# Patient Record
Sex: FEMALE | Race: WHITE | Employment: OTHER | URBAN - METROPOLITAN AREA
[De-identification: names, ages, dates, MRNs, and addresses within clinical notes are randomized per-mention and may not be internally consistent; named-entity substitution may affect disease eponyms.]

---

## 2017-05-11 ENCOUNTER — HOSPITAL ENCOUNTER (OUTPATIENT)
Dept: MAMMOGRAPHY | Age: 57
Discharge: HOME | End: 2017-05-11
Attending: INTERNAL MEDICINE
Payer: COMMERCIAL

## 2017-05-11 DIAGNOSIS — Z12.31 VISIT FOR SCREENING MAMMOGRAM: ICD-10-CM

## 2017-05-11 PROCEDURE — 77067 SCR MAMMO BI INCL CAD: CPT

## 2018-01-03 ENCOUNTER — HOSPITAL ENCOUNTER (EMERGENCY)
Age: 58
Discharge: HOME | End: 2018-01-04
Attending: EMERGENCY MEDICINE
Payer: OTHER MISCELLANEOUS

## 2018-01-03 DIAGNOSIS — Z77.098 CHEMICAL EXPOSURE: Primary | ICD-10-CM

## 2018-01-03 PROCEDURE — 85025 COMPLETE CBC W/AUTO DIFF WBC: CPT | Performed by: EMERGENCY MEDICINE

## 2018-01-03 PROCEDURE — 36415 COLL VENOUS BLD VENIPUNCTURE: CPT | Performed by: EMERGENCY MEDICINE

## 2018-01-03 PROCEDURE — 99284 EMERGENCY DEPT VISIT MOD MDM: CPT

## 2018-01-03 PROCEDURE — 80053 COMPREHEN METABOLIC PANEL: CPT | Performed by: EMERGENCY MEDICINE

## 2018-01-03 PROCEDURE — 93005 ELECTROCARDIOGRAM TRACING: CPT

## 2018-01-04 ENCOUNTER — APPOINTMENT (OUTPATIENT)
Dept: GENERAL RADIOLOGY | Age: 58
End: 2018-01-04
Attending: EMERGENCY MEDICINE
Payer: OTHER MISCELLANEOUS

## 2018-01-04 VITALS
WEIGHT: 165.6 LBS | TEMPERATURE: 97.8 F | SYSTOLIC BLOOD PRESSURE: 121 MMHG | DIASTOLIC BLOOD PRESSURE: 72 MMHG | HEIGHT: 65 IN | OXYGEN SATURATION: 98 % | BODY MASS INDEX: 27.59 KG/M2 | HEART RATE: 82 BPM | RESPIRATION RATE: 16 BRPM

## 2018-01-04 LAB
ALBUMIN SERPL-MCNC: 3.9 G/DL (ref 3.5–5)
ALBUMIN/GLOB SERPL: 1.1 {RATIO} (ref 1.1–2.2)
ALP SERPL-CCNC: 63 U/L (ref 45–117)
ALT SERPL-CCNC: 26 U/L (ref 12–78)
ANION GAP SERPL CALC-SCNC: 9 MMOL/L (ref 5–15)
AST SERPL-CCNC: 20 U/L (ref 15–37)
ATRIAL RATE: 79 BPM
BASOPHILS # BLD: 0 K/UL (ref 0–0.1)
BASOPHILS NFR BLD: 1 % (ref 0–1)
BILIRUB SERPL-MCNC: 0.4 MG/DL (ref 0.2–1)
BUN SERPL-MCNC: 14 MG/DL (ref 6–20)
BUN/CREAT SERPL: 20 (ref 12–20)
CALCIUM SERPL-MCNC: 8.9 MG/DL (ref 8.5–10.1)
CALCULATED P AXIS, ECG09: 51 DEGREES
CALCULATED R AXIS, ECG10: -8 DEGREES
CALCULATED T AXIS, ECG11: 26 DEGREES
CHLORIDE SERPL-SCNC: 107 MMOL/L (ref 97–108)
CO2 SERPL-SCNC: 24 MMOL/L (ref 21–32)
COHGB MFR BLD: 1.1 % (ref 1–2)
CREAT SERPL-MCNC: 0.71 MG/DL (ref 0.55–1.02)
DIAGNOSIS, 93000: NORMAL
EOSINOPHIL # BLD: 0.2 K/UL (ref 0–0.4)
EOSINOPHIL NFR BLD: 2 % (ref 0–7)
ERYTHROCYTE [DISTWIDTH] IN BLOOD BY AUTOMATED COUNT: 13.3 % (ref 11.5–14.5)
GLOBULIN SER CALC-MCNC: 3.6 G/DL (ref 2–4)
GLUCOSE SERPL-MCNC: 99 MG/DL (ref 65–100)
HCT VFR BLD AUTO: 38.8 % (ref 35–47)
HGB BLD OXIMETRY-MCNC: 14.7 G/DL (ref 14–17)
HGB BLD-MCNC: 13.1 G/DL (ref 11.5–16)
LYMPHOCYTES # BLD: 3.2 K/UL (ref 0.8–3.5)
LYMPHOCYTES NFR BLD: 40 % (ref 12–49)
MCH RBC QN AUTO: 30 PG (ref 26–34)
MCHC RBC AUTO-ENTMCNC: 33.8 G/DL (ref 30–36.5)
MCV RBC AUTO: 89 FL (ref 80–99)
METHGB MFR BLD: 0.2 % (ref 0–1.4)
MONOCYTES # BLD: 0.4 K/UL (ref 0–1)
MONOCYTES NFR BLD: 5 % (ref 5–13)
NEUTS SEG # BLD: 4.1 K/UL (ref 1.8–8)
NEUTS SEG NFR BLD: 52 % (ref 32–75)
OXYHGB MFR BLD: 97.8 % (ref 94–97)
P-R INTERVAL, ECG05: 162 MS
PLATELET # BLD AUTO: 218 K/UL (ref 150–400)
POTASSIUM SERPL-SCNC: 3.4 MMOL/L (ref 3.5–5.1)
PROT SERPL-MCNC: 7.5 G/DL (ref 6.4–8.2)
Q-T INTERVAL, ECG07: 408 MS
QRS DURATION, ECG06: 76 MS
QTC CALCULATION (BEZET), ECG08: 467 MS
RBC # BLD AUTO: 4.36 M/UL (ref 3.8–5.2)
SAO2 % BLD: 99 % (ref 95–99)
SODIUM SERPL-SCNC: 140 MMOL/L (ref 136–145)
VENTRICULAR RATE, ECG03: 79 BPM
WBC # BLD AUTO: 7.9 K/UL (ref 3.6–11)

## 2018-01-04 PROCEDURE — 74011250636 HC RX REV CODE- 250/636: Performed by: PHYSICIAN ASSISTANT

## 2018-01-04 PROCEDURE — 77030029684 HC NEB SM VOL KT MONA -A

## 2018-01-04 PROCEDURE — 96376 TX/PRO/DX INJ SAME DRUG ADON: CPT

## 2018-01-04 PROCEDURE — 96361 HYDRATE IV INFUSION ADD-ON: CPT

## 2018-01-04 PROCEDURE — 94640 AIRWAY INHALATION TREATMENT: CPT

## 2018-01-04 PROCEDURE — 74011000250 HC RX REV CODE- 250: Performed by: PHYSICIAN ASSISTANT

## 2018-01-04 PROCEDURE — 96374 THER/PROPH/DIAG INJ IV PUSH: CPT

## 2018-01-04 PROCEDURE — 82375 ASSAY CARBOXYHB QUANT: CPT | Performed by: EMERGENCY MEDICINE

## 2018-01-04 PROCEDURE — 96375 TX/PRO/DX INJ NEW DRUG ADDON: CPT

## 2018-01-04 PROCEDURE — 71046 X-RAY EXAM CHEST 2 VIEWS: CPT

## 2018-01-04 RX ORDER — ALBUTEROL SULFATE 0.83 MG/ML
5 SOLUTION RESPIRATORY (INHALATION)
Status: COMPLETED | OUTPATIENT
Start: 2018-01-04 | End: 2018-01-04

## 2018-01-04 RX ORDER — ONDANSETRON 4 MG/1
4 TABLET, ORALLY DISINTEGRATING ORAL
Qty: 12 TAB | Refills: 0 | Status: SHIPPED | OUTPATIENT
Start: 2018-01-04 | End: 2018-01-14

## 2018-01-04 RX ORDER — ONDANSETRON 2 MG/ML
4 INJECTION INTRAMUSCULAR; INTRAVENOUS
Status: COMPLETED | OUTPATIENT
Start: 2018-01-04 | End: 2018-01-04

## 2018-01-04 RX ORDER — KETOROLAC TROMETHAMINE 30 MG/ML
15 INJECTION, SOLUTION INTRAMUSCULAR; INTRAVENOUS
Status: COMPLETED | OUTPATIENT
Start: 2018-01-04 | End: 2018-01-04

## 2018-01-04 RX ORDER — PREDNISONE 20 MG/1
60 TABLET ORAL DAILY
Qty: 15 TAB | Refills: 0 | Status: SHIPPED | OUTPATIENT
Start: 2018-01-04 | End: 2018-01-09

## 2018-01-04 RX ORDER — ONDANSETRON 2 MG/ML
8 INJECTION INTRAMUSCULAR; INTRAVENOUS
Status: COMPLETED | OUTPATIENT
Start: 2018-01-04 | End: 2018-01-04

## 2018-01-04 RX ORDER — ALBUTEROL SULFATE 90 UG/1
1 AEROSOL, METERED RESPIRATORY (INHALATION)
Qty: 1 INHALER | Refills: 0 | Status: SHIPPED | OUTPATIENT
Start: 2018-01-04 | End: 2018-09-07 | Stop reason: ALTCHOICE

## 2018-01-04 RX ADMIN — ONDANSETRON 4 MG: 2 INJECTION INTRAMUSCULAR; INTRAVENOUS at 02:39

## 2018-01-04 RX ADMIN — ALBUTEROL SULFATE 5 MG: 2.5 SOLUTION RESPIRATORY (INHALATION) at 03:13

## 2018-01-04 RX ADMIN — ONDANSETRON 8 MG: 2 INJECTION INTRAMUSCULAR; INTRAVENOUS at 00:41

## 2018-01-04 RX ADMIN — KETOROLAC TROMETHAMINE 15 MG: 30 INJECTION, SOLUTION INTRAMUSCULAR at 02:03

## 2018-01-04 RX ADMIN — SODIUM CHLORIDE 1000 ML: 900 INJECTION, SOLUTION INTRAVENOUS at 02:03
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
## 2018-01-04 NOTE — DISCHARGE INSTRUCTIONS
Exposure to Toxins: Care Instructions  Your Care Instructions    Toxins are poisonous substances that can harm your body. If your doctor is concerned that your symptoms are caused by exposure to a toxic substance, he or she may ask you about your home, your work, your family, and other aspects of your environment. You also may have blood tests or X-rays to find out if a toxin is in your body. For example, you may have been around smoke from a fire. Or you may have been around fumes from paints, solvents, or waste products from workshops or factories. But in some cases it may be hard to find out what you may have been exposed to. Sometimes it can take years before you have symptoms. For instance, a  may have lung disease many years after working in mines. And being exposed to some toxins can make health problems you already have worse. Follow-up care is a key part of your treatment and safety. Be sure to make and go to all appointments, and call your doctor if you are having problems. It's also a good idea to know your test results and keep a list of the medicines you take. How can you care for yourself at home? · If you think you may have been exposed to a toxin but are not sure what it might be, try to keep a written record of your symptoms. Note when and where you have symptoms. And note any possible health hazards. For example, you may find out that you feel sick to your stomach during your workweek, but you feel better on weekends. · It may help to increase the amount of fresh air in your home. · If you can, try to control your exposure to hazardous materials. ¨ Avoid cigarette smoke. Cigarettes contain many chemicals that are hazardous to your health. ¨ Keep your home clean and as free from dust as you can. Dust can irritate your lungs. ¨ Get a carbon monoxide alarm for your home. Carbon monoxide comes from cars, space heaters, and other heat sources. It can be deadly.   ¨ When you use cleaning or home improvement products, make sure to open windows or use an exhaust fan. Never mix household chemicals, such as chlorine and ammonia. Some mixtures can create toxic fumes that can be deadly. ¨ Read the label on house and garden chemicals. Be sure to follow all safety directions. Try to limit your use of lawn and garden pesticides. ¨ Keep in mind that the farther away you are from a hazardous source, the less exposure you will receive. When should you call for help? Call 911 anytime you think you may need emergency care. For example, call if:  ? · You have trouble breathing. ?Call your doctor now or seek immediate medical care if:  ? · You get household chemicals in your mouth or eyes. Call the [address] [place] [address] ([phone]). ? · You think you may have been exposed to a hazardous material.   ? Watch closely for changes in your health, and be sure to contact your doctor if:  ? · You do not get better as expected. Where can you learn more? Go to http://roro-benito.info/. Enter B226 in the search box to learn more about \"Exposure to Toxins: Care Instructions. \"  Current as of: July 29, 2016  Content Version: 11.4  © 5086-4916 [employer]. Care instructions adapted under license by [employer] (which disclaims liability or warranty for this information). If you have questions about a medical condition or this instruction, always ask your healthcare professional. [place] [address] any warranty or liability for your use of this information. We hope that we have addressed all of your medical concerns. The examination and treatment you received in the Emergency Department were for an emergent problem and were not intended as complete care. It is important that you follow up with your healthcare provider(s) for ongoing care.  If your symptoms worsen or do not improve as expected, and you are unable to reach your usual health care provider(s), you should return to the Emergency Department. Today's healthcare is undergoing tremendous change, and patient satisfaction surveys are one of the many tools to assess the quality of medical care. You may receive a survey from the [employer] organization regarding your experience in the Emergency Department. I hope that your experience has been completely positive, particularly the medical care that I provided. As such, please participate in the survey; anything less than excellent does not meet my expectations or intentions. [facility] and [facility] participate in nationally recognized quality of care measures. If your blood pressure is greater than 120/80, as reported below, we urge that you seek medical care to address the potential of high blood pressure, commonly known as hypertension. Hypertension can be hereditary or can be caused by certain medical conditions, pain, stress, or \"white coat syndrome. \"       Please make an appointment with your health care provider(s) for follow up of your Emergency Department visit. VITALS:   Patient Vitals for the past 8 hrs:   Temp Pulse Resp BP SpO2   01/04/18 0230 98 °F (36.7 °C) 80 16 146/85 -   01/04/18 0130 - - - (!) 125/94 -   01/03/18 2335 97.4 °F (36.3 °C) 72 18 (!) 149/98 97 %          Thank you for allowing us to provide you with medical care today. We realize that you have many choices for your emergency care needs. Please choose us in the future for any continued health care needs. [facility]  Francisco Lombardi, [address]: [phone]            Recent Results (from the past 24 hour(s))   EKG, 12 LEAD, INITIAL    Collection Time: 01/03/18 11:44 PM   Result Value Ref Range    Ventricular Rate 79 BPM    Atrial Rate 79 BPM    P-R Interval 162 ms    QRS Duration 76 ms    Q-T Interval 408 ms    QTC Calculation (Bezet) 467 ms Calculated P Axis 51 degrees    Calculated R Axis -8 degrees    Calculated T Axis 26 degrees    Diagnosis       Sinus rhythm with premature supraventricular complexes and with occasional   premature ventricular complexes  When compared with ECG of 07-FEB-2014 06:18,  premature ventricular complexes are now present  premature supraventricular complexes are now present     CBC WITH AUTOMATED DIFF    Collection Time: 01/03/18 11:52 PM   Result Value Ref Range    WBC 7.9 3.6 - 11.0 K/uL    RBC 4.36 3.80 - 5.20 M/uL    HGB 13.1 11.5 - 16.0 g/dL    HCT 38.8 35.0 - 47.0 %    MCV 89.0 80.0 - 99.0 FL    MCH 30.0 26.0 - 34.0 PG    MCHC 33.8 30.0 - 36.5 g/dL    RDW 13.3 11.5 - 14.5 %    PLATELET 560 349 - 012 K/uL    NEUTROPHILS 52 32 - 75 %    LYMPHOCYTES 40 12 - 49 %    MONOCYTES 5 5 - 13 %    EOSINOPHILS 2 0 - 7 %    BASOPHILS 1 0 - 1 %    ABS. NEUTROPHILS 4.1 1.8 - 8.0 K/UL    ABS. LYMPHOCYTES 3.2 0.8 - 3.5 K/UL    ABS. MONOCYTES 0.4 0.0 - 1.0 K/UL    ABS. EOSINOPHILS 0.2 0.0 - 0.4 K/UL    ABS. BASOPHILS 0.0 0.0 - 0.1 K/UL   METABOLIC PANEL, COMPREHENSIVE    Collection Time: 01/03/18 11:52 PM   Result Value Ref Range    Sodium 140 136 - 145 mmol/L    Potassium 3.4 (L) 3.5 - 5.1 mmol/L    Chloride 107 97 - 108 mmol/L    CO2 24 21 - 32 mmol/L    Anion gap 9 5 - 15 mmol/L    Glucose 99 65 - 100 mg/dL    BUN 14 6 - 20 MG/DL    Creatinine 0.71 0.55 - 1.02 MG/DL    BUN/Creatinine ratio 20 12 - 20      GFR est AA >60 >60 ml/min/1.73m2    GFR est non-AA >60 >60 ml/min/1.73m2    Calcium 8.9 8.5 - 10.1 MG/DL    Bilirubin, total 0.4 0.2 - 1.0 MG/DL    ALT (SGPT) 26 12 - 78 U/L    AST (SGOT) 20 15 - 37 U/L    Alk.  phosphatase 63 45 - 117 U/L    Protein, total 7.5 6.4 - 8.2 g/dL    Albumin 3.9 3.5 - 5.0 g/dL    Globulin 3.6 2.0 - 4.0 g/dL    A-G Ratio 1.1 1.1 - 2.2     CARBOXY HEMOGLOBIN    Collection Time: 01/04/18 12:15 AM   Result Value Ref Range    Carboxy-Hgb 1.1 1 - 2 %    Methemoglobin 0.2 0 - 1.4 %    tHb 14.7 14 - 17 g/dL Oxyhemoglobin 97.8 (H) 94 - 97 %    O2 SATURATION 99 95 - 99 %       Xr Chest Pa Lat    Result Date: 1/4/2018  HISTORY:  Chemical exposure Frontal and lateral views of the chest show clear lungs. The heart, mediastinum and pulmonary vasculature are normal.  The bony thorax is unremarkable. IMPRESSION: NORMAL CHEST.

## 2018-01-04 NOTE — ED NOTES
Spoke with Judson from poison control, and updated vitals, lab results, and patient symptoms. Recommended to treat patient symptomatically, and possibly systemic corticosteroids.

## 2018-01-04 NOTE — ED NOTES
Poison control contacted: poss symptoms are n/v/d, abdominal pain, bronchospasm, respiratory distress. Corticosteroids may be used for bronchospasm. Observe until symptoms resolved.

## 2018-01-18 ENCOUNTER — HOSPITAL ENCOUNTER (OUTPATIENT)
Dept: CT IMAGING | Age: 58
Discharge: HOME | End: 2018-01-18
Attending: INTERNAL MEDICINE
Payer: OTHER MISCELLANEOUS

## 2018-01-18 DIAGNOSIS — T59.91XS: ICD-10-CM

## 2018-01-18 DIAGNOSIS — R05.9 COUGH: ICD-10-CM

## 2018-01-18 PROCEDURE — 71250 CT THORAX DX C-: CPT

## 2018-02-23 ENCOUNTER — HOSPITAL ENCOUNTER (OUTPATIENT)
Dept: CT IMAGING | Age: 58
Discharge: HOME | End: 2018-02-23
Attending: INTERNAL MEDICINE
Payer: OTHER MISCELLANEOUS

## 2018-02-23 DIAGNOSIS — R11.0 NAUSEA: ICD-10-CM

## 2018-02-23 DIAGNOSIS — R10.9 ABDOMINAL PAIN: ICD-10-CM

## 2018-02-23 PROCEDURE — 74011000258 HC RX REV CODE- 258: Performed by: INTERNAL MEDICINE

## 2018-02-23 PROCEDURE — 74011636320 HC RX REV CODE- 636/320: Performed by: INTERNAL MEDICINE

## 2018-02-23 PROCEDURE — 74177 CT ABD & PELVIS W/CONTRAST: CPT

## 2018-02-23 RX ORDER — SODIUM CHLORIDE 0.9 % (FLUSH) 0.9 %
10 SYRINGE (ML) INJECTION
Status: COMPLETED | OUTPATIENT
Start: 2018-02-23 | End: 2018-02-23

## 2018-02-23 RX ADMIN — IOHEXOL 50 ML: 240 INJECTION, SOLUTION INTRATHECAL; INTRAVASCULAR; INTRAVENOUS; ORAL at 10:27

## 2018-02-23 RX ADMIN — SODIUM CHLORIDE 100 ML: 900 INJECTION, SOLUTION INTRAVENOUS at 10:27

## 2018-02-23 RX ADMIN — Medication 10 ML: at 10:26

## 2018-02-23 RX ADMIN — IOPAMIDOL 100 ML: 755 INJECTION, SOLUTION INTRAVENOUS at 10:26

## 2018-09-06 PROBLEM — R00.2 PALPITATIONS: Status: ACTIVE | Noted: 2018-09-06

## 2018-09-07 ENCOUNTER — CLINICAL SUPPORT (OUTPATIENT)
Dept: CARDIOLOGY CLINIC | Age: 58
End: 2018-09-07

## 2018-09-07 ENCOUNTER — OFFICE VISIT (OUTPATIENT)
Dept: CARDIOLOGY CLINIC | Age: 58
End: 2018-09-07

## 2018-09-07 VITALS
HEIGHT: 65 IN | OXYGEN SATURATION: 95 % | HEART RATE: 90 BPM | BODY MASS INDEX: 26.82 KG/M2 | RESPIRATION RATE: 18 BRPM | SYSTOLIC BLOOD PRESSURE: 110 MMHG | DIASTOLIC BLOOD PRESSURE: 80 MMHG | WEIGHT: 161 LBS

## 2018-09-07 DIAGNOSIS — R00.2 PALPITATIONS: Primary | ICD-10-CM

## 2018-09-07 DIAGNOSIS — R07.89 OTHER CHEST PAIN: ICD-10-CM

## 2018-09-07 DIAGNOSIS — R00.2 PALPITATIONS: ICD-10-CM

## 2018-09-07 RX ORDER — MULTIVIT WITH MINERALS/HERBS
1 TABLET ORAL DAILY
COMMUNITY

## 2018-09-07 RX ORDER — AA/PROT/LYSINE/METHIO/VIT C/B6 50-12.5 MG
TABLET ORAL
COMMUNITY

## 2018-09-07 RX ORDER — GLUCOSAMINE HCL 500 MG
TABLET ORAL
COMMUNITY

## 2018-09-07 RX ORDER — INDOMETHACIN 25 MG/1
100 CAPSULE ORAL AS NEEDED
Status: ON HOLD | COMMUNITY
End: 2022-04-12

## 2018-09-07 RX ORDER — ZINC GLUCONATE 10 MG
LOZENGE ORAL
COMMUNITY

## 2018-09-07 RX ORDER — DESIPRAMINE HYDROCHLORIDE 25 MG/1
TABLET ORAL
Refills: 12 | COMMUNITY
Start: 2018-06-25 | End: 2019-03-21 | Stop reason: ALTCHOICE

## 2018-09-07 RX ORDER — BISMUTH SUBSALICYLATE 262 MG
1 TABLET,CHEWABLE ORAL DAILY
COMMUNITY

## 2018-09-07 RX ORDER — ASCORBIC ACID 250 MG
TABLET ORAL
COMMUNITY

## 2018-09-07 RX ORDER — MODAFINIL 100 MG/1
TABLET ORAL
Refills: 1 | Status: ON HOLD | COMMUNITY
Start: 2018-06-04 | End: 2022-04-12

## 2018-09-07 NOTE — MR AVS SNAPSHOT
[address] [facility] [address] [place] 
[phone] Patient: Justino Salinas MRN: [id number] VBL:[birth year] Visit Information Date & Time Provider Department Dept. Phone Encounter #  
 9/7/2018 11:00 AM Rene Castro MD [facility] Matthew Ojeda [phone] [id number] Follow-up Instructions Return in about 4 weeks (around 10/5/2018). Your Appointments 9/18/2018  4:00 PM  
ECHO CARDIOGRAMS 2D with ECHO, [facility] ([facility] SCHEDULING) Appt Note: echo per Dr. Tobi Silver dx PVCs, palps [address] [place] [address]  [address] [place] [address]  
[address] [facility]  
  
    
 10/8/2018 10:20 AM  
ESTABLISHED PATIENT with Rene Castro MD  
[facility] ([facility] SCHEDULING) Appt Note: 1 mo f/u  
 [address] [place] [address]  
[address] [facility],[address] [address] Upcoming Health Maintenance Date Due Hepatitis C Screening 1960 DTaP/Tdap/Td series (1 - Tdap) 11/9/1980 PAP AKA CERVICAL CYTOLOGY 11/9/1980 FOBT Q 1 YEAR AGE 50-75 11/9/2009 Influenza Age 5 to Adult 8/1/2018 BREAST CANCER SCRN MAMMOGRAM 5/11/2019 Allergies as of 9/7/2018  Review Complete On: 9/7/2018 By: Rene Castro MD  
  
 Severity Noted Reaction Type Reactions Compazine [Prochlorperazine Edisylate] Medium 06/28/2011   Systemic Other (comments) Tardive dyskinesia Red Dye  10/01/2012    Hives Current Immunizations  Never Reviewed No immunizations on file. Not reviewed this visit You Were Diagnosed With   
  
 Codes Comments Palpitations    -  Primary ICD-10-CM: R00.2 ICD-9-CM: 785.1 Other chest pain     ICD-10-CM: R07.89 ICD-9-CM: 786.59 Vitals BP Pulse Resp Height(growth percentile) Weight(growth percentile) SpO2  
 110/80 (BP 1 Location: Left arm, BP Patient Position: Sitting) 90 18 5' 5\" (1.651 m) 161 lb (73 kg) 95% BMI OB Status Smoking Status 26.79 kg/m2 Postmenopausal Never Smoker Vitals History BMI and BSA Data Body Mass Index Body Surface Area  
 26.79 kg/m 2 1.83 m 2 Preferred Pharmacy Pharmacy Name Phone [id number]. [phone] Your Updated Medication List  
  
   
This list is accurate as of 9/7/18  1:01 PM.  Always use your most recent med list.  
  
  
  
  
 b complex vitamins tablet Take 1 Tab by mouth daily. Cholecalciferol (Vitamin D3) 3,000 unit Tab Take  by mouth. CO Q-10 10 mg Cap Generic drug:  coenzyme q10 Take  by mouth. desipramine 10 mg tablet Commonly known as:  NOPRAMIN  
TAKE 1-2 TABLETS BY MOUTH 2 TIMES A DAY  
  
 FISH OIL PO Take  by mouth. indomethacin 25 mg capsule Commonly known as:  INDOCIN Take 100 mg by mouth daily. magnesium 250 mg Tab Take  by mouth.  
  
 modafinil 100 mg tablet Commonly known as:  PROVIGIL  
TAKE 1 TABLET BY MOUTH EVERY MORNING  
  
 multivitamin tablet Commonly known as:  ONE A DAY Take 1 Tab by mouth daily. N-ACETYL-ALPHA-D-GLUCOSAMINE  
by Does Not Apply route. POTASSIUM PO Take  by mouth. QUEtiapine 25 mg tablet Commonly known as:  SEROquel TAKE 1 TABLET BY MOUTH AT BEDTIME OR AS DIRECTED  
  
 VITAMIN C 250 mg tablet Generic drug:  ascorbic acid (vitamin C) Take  by mouth. Follow-up Instructions Return in about 4 weeks (around 10/5/2018). Introducing [facility]! Dear Tong Rodriguez: Thank you for requesting a [employer]ervault account. Our records indicate that you already have an active [employer]ervault account. You can access your account anytime at [email or web address] Did you know that you can access your hospital and ER discharge instructions at any time in [employer]? You can also review all of your test results from your hospital stay or ER visit. Additional Information If you have questions, please visit the Frequently Asked Questions section of the [employer] website at [email or web address] Remember, [employer] is NOT to be used for urgent needs. For medical emergencies, dial 911. Now available from your iPhone and Android! Please provide this summary of care documentation to your next provider. Your primary care clinician is listed as Jim. If you have any questions after today's visit, please call [phone].

## 2018-09-07 NOTE — PROGRESS NOTES
HISTORY OF PRESENT ILLNESS  Moy Aguilera is a 61 y.o. female     SUMMARY:   Problem List  Date Reviewed: 8/6/2018          Codes Class Noted    Palpitations ICD-10-CM: R00.2  ICD-9-CM: 785.1  9/6/2018        Irritable bowel syndrome with diarrhea ICD-10-CM: K58.0  ICD-9-CM: 564.1  9/20/2016        Desmoid tumor, abdominal incision ICD-10-CM: D48.1  ICD-9-CM: 238.1  2/6/2014        Incisional pain ICD-10-CM: E21.90  ICD-9-CM: 782.0  1/13/2014        Wound infection after surgery ICD-10-CM: T81. 4XXA  ICD-9-CM: 998.59  11/7/2012        Diverticulitis ICD-10-CM: Q99.06  ICD-9-CM: 562.11  9/10/2012              Current Outpatient Prescriptions on File Prior to Visit   Medication Sig    QUEtiapine (SEROQUEL) 25 mg tablet TAKE 1 TABLET BY MOUTH AT BEDTIME OR AS DIRECTED    albuterol (PROVENTIL HFA, VENTOLIN HFA, PROAIR HFA) 90 mcg/actuation inhaler Take 1 Puff by inhalation every four (4) hours as needed for Wheezing.  dicyclomine (BENTYL) 10 mg capsule Take 1 Cap by mouth four (4) times daily as needed. No current facility-administered medications on file prior to visit. CARDIOLOGY STUDIES TO DATE:  none  Chief Complaint   Patient presents with    Palpitations     HPI :  Ms. Opal Renteria is a 61year-old  referred by Dr. Rikki Vasques for cardiac evaluation. 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 She has had hyperbaric oxygen and has visited multiple different toxicologists to try to sort through all of this. Her concern for us is frequent palpitations that she has noticed ever since this started and she had not had prior to it. They will occur off and on throughout the day without any associated symptoms. Her  who is a podiatrist did get her some sort of home monitor, which she showed me and I reviewed. It shows sinus rhythm with PACs and PVCs. She walks 30 minutes twice a day without any symptoms suggestive of angina or heart failure, though she does have occasional nonexertional sharp localized anterior chest pain. There is no history of hypertension or diabetes. She has never smoked. Cholesterol has been okay. Family history is negative for premature coronary disease. I reviewed her EKG done by Dr. Cedric Martinez in early August, which showed normal sinus rhythm, normal intervals and axis and no ST-T wave changes. She has occasional episodes of dizziness, which is associated with nausea and it sounds like central rather than orthostatic. She has frequent headaches and some trouble swallowing. She has difficulty sleeping, memory problems, nervousness and anxiety. CARDIAC ROS:   negative for dyspnea, syncope, orthopnea, paroxysmal nocturnal dyspnea, exertional chest pressure/discomfort, claudication, lower extremity edema    Family History   Problem Relation Age of Onset    Hypertension Mother     Stroke Mother     Diabetes Mother     Asthma Neg Hx     Cancer Neg Hx     Anesth Problems Neg Hx        Past Medical History:   Diagnosis Date    Desmoid tumor, abdominal incision 2/6/2014    Diverticulitis     Partial colectomy 2012    Irritable bowel syndrome with diarrhea 9/20/2016    Wound infection after surgery 11/7/2012       GENERAL ROS:  A comprehensive review of systems was negative except for that written in the HPI.     Visit Vitals    /80 (BP 1 Location: Left arm, BP Patient Position: Sitting)    Pulse 90    Resp 18    Ht 5' 5\" (1.651 m)    Wt 161 lb (73 kg)    SpO2 95%    BMI 26.79 kg/m2       Wt Readings from Last 3 Encounters:   09/07/18 161 lb (73 kg)   08/02/18 164 lb (74.4 kg)   05/09/18 163 lb (73.9 kg)            BP Readings from Last 3 Encounters:   09/07/18 110/80   08/02/18 115/70   04/09/18 125/80       PHYSICAL EXAM  General appearance: alert, cooperative, no distress, appears stated age  Neurologic: Alert and oriented X 3  Neck: supple, symmetrical, trachea midline, no adenopathy, no carotid bruit and no JVD  Lungs: clear to auscultation bilaterally  Heart: regular rate and rhythm, S1, S2 normal, no murmur, click, rub or gallop  Abdomen: soft, non-tender. Bowel sounds normal. No masses,  no organomegaly  Extremities: extremities normal, atraumatic, no cyanosis or edema  Pulses: 2+ and symmetric      ASSESSMENT  With all of this going on with Ms. Vanegas Settler am not surprised that she is having palpitations. I do think she needs an event monitor to make sure there is nothing more serious going on than what we have seen on her home monitor and we need to get an echocardiogram to make sure there is no structural heart disease related to her occupational exposure. I do not think she needs any stress testing at this time given her lack of significant symptoms and risk factors. current treatment plan is effective, no change in therapy  lab results and schedule of future lab studies reviewed with patient  reviewed diet, exercise and weight control    Encounter Diagnoses   Name Primary?  Palpitations Yes     Orders Placed This Encounter    desipramine (NOPRAMIN) 10 mg tablet    modafinil (PROVIGIL) 100 mg tablet    indomethacin (INDOCIN) 25 mg capsule    magnesium 250 mg tab    acetylglucosamine (N-ACETYL-ALPHA-D-GLUCOSAMINE)    b complex vitamins tablet    Cholecalciferol, Vitamin D3, 3,000 unit tab    coenzyme q10 (CO Q-10) 10 mg cap    POTASSIUM PO    ascorbic acid, vitamin C, (VITAMIN C) 250 mg tablet    multivitamin (ONE A DAY) tablet    docosahexanoic acid/epa (FISH OIL PO)       Follow-up Disposition:  Return in about 4 weeks (around 10/5/2018).     Corin Siddiqui MD  9/7/2018

## 2018-09-18 ENCOUNTER — CLINICAL SUPPORT (OUTPATIENT)
Dept: CARDIOLOGY CLINIC | Age: 58
End: 2018-09-18

## 2018-09-18 DIAGNOSIS — R00.2 PALPITATIONS: ICD-10-CM

## 2018-09-18 DIAGNOSIS — R07.89 OTHER CHEST PAIN: ICD-10-CM

## 2018-09-20 ENCOUNTER — DOCUMENTATION ONLY (OUTPATIENT)
Dept: CARDIOLOGY CLINIC | Age: 58
End: 2018-09-20

## 2018-09-20 NOTE — PROGRESS NOTES
Called patient. Verified patient's identity with two identifiers. Notified patient of Dr. Balwinder Adorno message. She is going tosee if she can see the note in CC from [place]. I told her I would also copy and paste Dr. Balwinder Adorno note to a [facility] message and send it to her. Patient will call if she needs anything else and will keep scheduled follow up appointment.     Future Appointments  Date Time Provider Teresa Hightower   10/8/2018 10:20 AM Mario Cotton  [address]

## 2018-09-20 NOTE — PROGRESS NOTES
Heart muscle is strong and valves all ok. Small amount of fluid in lining sack around heart, probably from all the inflammation she has had as a result of her toxin exposure. This was also seen on the chest ct she had in January. It is not causing any problems at this point. We will discuss further at fup.

## 2018-10-18 ENCOUNTER — OFFICE VISIT (OUTPATIENT)
Dept: CARDIOLOGY CLINIC | Age: 58
End: 2018-10-18

## 2018-10-18 VITALS
RESPIRATION RATE: 18 BRPM | BODY MASS INDEX: 26.99 KG/M2 | WEIGHT: 162 LBS | HEART RATE: 90 BPM | DIASTOLIC BLOOD PRESSURE: 70 MMHG | OXYGEN SATURATION: 99 % | SYSTOLIC BLOOD PRESSURE: 108 MMHG | HEIGHT: 65 IN

## 2018-10-18 DIAGNOSIS — I49.3 PVC (PREMATURE VENTRICULAR CONTRACTION): ICD-10-CM

## 2018-10-18 DIAGNOSIS — R00.2 PALPITATIONS: Primary | ICD-10-CM

## 2018-10-18 NOTE — PROGRESS NOTES
HISTORY OF PRESENT ILLNESS  Bekah Whitaker is a 61 y.o. female     SUMMARY:   Problem List  Date Reviewed: 10/18/2018          Codes Class Noted    Palpitations ICD-10-CM: R00.2  ICD-9-CM: 785.1  9/6/2018    Overview Signed 10/18/2018  7:49 AM by Bhavana Romero MD     9/18 insignificant pericardial effusion, otherwise normal echo             Irritable bowel syndrome with diarrhea ICD-10-CM: K58.0  ICD-9-CM: 564.1  9/20/2016        Desmoid tumor, abdominal incision ICD-10-CM: D48.1  ICD-9-CM: 238.1  2/6/2014        Incisional pain ICD-10-CM: L76.82  ICD-9-CM: 782.0  1/13/2014        Wound infection after surgery ICD-10-CM: T81.49XA  ICD-9-CM: 998.59  11/7/2012        Diverticulitis ICD-10-CM: V79.62  ICD-9-CM: 562.11  9/10/2012              Current Outpatient Medications on File Prior to Visit   Medication Sig    desipramine (NORPRAMIN) 25 mg tablet Once a day at night    modafinil (PROVIGIL) 100 mg tablet TAKE 1 TABLET BY MOUTH EVERY MORNING    indomethacin (INDOCIN) 25 mg capsule Take 100 mg by mouth as needed.  magnesium 250 mg tab Take  by mouth.  acetylglucosamine (N-ACETYL-ALPHA-D-GLUCOSAMINE) by Does Not Apply route.  b complex vitamins tablet Take 1 Tab by mouth daily.  Cholecalciferol, Vitamin D3, 3,000 unit tab Take  by mouth.  coenzyme q10 (CO Q-10) 10 mg cap Take  by mouth.  POTASSIUM PO Take  by mouth.  ascorbic acid, vitamin C, (VITAMIN C) 250 mg tablet Take  by mouth.  multivitamin (ONE A DAY) tablet Take 1 Tab by mouth daily.  docosahexanoic acid/epa (FISH OIL PO) Take  by mouth.  QUEtiapine (SEROQUEL) 25 mg tablet TAKE 1 TABLET BY MOUTH AT BEDTIME OR AS DIRECTED     No current facility-administered medications on file prior to visit.         CARDIOLOGY STUDIES TO DATE:  9/18 trivial pericardial effusion otherwise normal echo  9/18 event monitor, nsr, sinus tachy, pvcs      Chief Complaint   Patient presents with    Palpitations     HPI :  Ms. Lisa Stringer is still having all kinds of symptoms, which we described in detail when she was last here. I reviewed her recent echocardiogram and event monitor recordings, which were benign. She is still having hyperbaric treatments and seeing a neuropsychologist and other physicians to try to sort out what to do about this toxic exposure she suffered. CARDIAC ROS:   negative for chest pain, dyspnea, syncope, orthopnea, paroxysmal nocturnal dyspnea, exertional chest pressure/discomfort, claudication, lower extremity edema    Family History   Problem Relation Age of Onset    Hypertension Mother     Stroke Mother     Diabetes Mother     Asthma Neg Hx     Cancer Neg Hx     Anesth Problems Neg Hx        Past Medical History:   Diagnosis Date    Desmoid tumor, abdominal incision 2/6/2014    Diverticulitis     Partial colectomy 2012    Irritable bowel syndrome with diarrhea 9/20/2016    Wound infection after surgery 11/7/2012       GENERAL ROS:  A comprehensive review of systems was negative except for that written in the HPI. Visit Vitals  /70 (BP 1 Location: Left arm, BP Patient Position: Sitting)   Pulse 90   Resp 18   Ht 5' 5\" (1.651 m)   Wt 162 lb (73.5 kg)   SpO2 99%   BMI 26.96 kg/m²       Wt Readings from Last 3 Encounters:   10/18/18 162 lb (73.5 kg)   09/07/18 161 lb (73 kg)   08/02/18 164 lb (74.4 kg)            BP Readings from Last 3 Encounters:   10/18/18 108/70   09/07/18 110/80   08/02/18 115/70       PHYSICAL EXAM  General appearance: alert, cooperative, no distress, appears stated age  Neurologic: Alert and oriented X 3  Neck: supple, symmetrical, trachea midline, no adenopathy, no carotid bruit and no JVD  Lungs: clear to auscultation bilaterally  Heart: regular rate and rhythm, S1, S2 normal, no murmur, click, rub or gallop  Extremities: extremities normal, atraumatic, no cyanosis or edema      ASSESSMENT  I think Ms. Edges PVCs, PACs and sinus tachycardia are all related to the stress associated with this toxic exposure, illness that she is enduring. Fortunately, she has no other worrisome symptoms. She has a normal heart and minimal cardiac risk factors, so I do not think any further cardiac testing is necessary at this time. We did talk about trying medication to suppress these, but she is leery of putting anything else into her body and I support that decision. current treatment plan is effective, no change in therapy  lab results and schedule of future lab studies reviewed with patient  reviewed diet, exercise and weight control    Encounter Diagnoses   Name Primary?  Palpitations Yes    PVC (premature ventricular contraction)      No orders of the defined types were placed in this encounter. Follow-up Disposition:  Return if symptoms worsen or fail to improve.     Aicha Angeles MD  10/18/2018

## 2018-10-19 ENCOUNTER — TELEPHONE (OUTPATIENT)
Dept: CARDIOLOGY CLINIC | Age: 58
End: 2018-10-19

## 2018-11-28 ENCOUNTER — HOSPITAL ENCOUNTER (OUTPATIENT)
Dept: MAMMOGRAPHY | Age: 58
Discharge: HOME | End: 2018-11-28
Attending: INTERNAL MEDICINE
Payer: COMMERCIAL

## 2018-11-28 DIAGNOSIS — Z12.39 SCREENING BREAST EXAMINATION: ICD-10-CM

## 2018-11-28 PROCEDURE — 77067 SCR MAMMO BI INCL CAD: CPT

## 2019-03-21 ENCOUNTER — OFFICE VISIT (OUTPATIENT)
Dept: CARDIOLOGY CLINIC | Age: 59
End: 2019-03-21

## 2019-03-21 VITALS
DIASTOLIC BLOOD PRESSURE: 76 MMHG | HEART RATE: 76 BPM | SYSTOLIC BLOOD PRESSURE: 116 MMHG | BODY MASS INDEX: 26.19 KG/M2 | OXYGEN SATURATION: 99 % | WEIGHT: 157.2 LBS | HEIGHT: 65 IN

## 2019-03-21 DIAGNOSIS — R00.2 PALPITATIONS: Primary | ICD-10-CM

## 2019-03-21 DIAGNOSIS — I49.3 PVC (PREMATURE VENTRICULAR CONTRACTION): ICD-10-CM

## 2019-03-21 NOTE — PROGRESS NOTES
HISTORY OF PRESENT ILLNESS  Torrey Delcid is a 60 y.o. female     SUMMARY:   Problem List  Date Reviewed: 10/18/2018          Codes Class Noted    Palpitations ICD-10-CM: R00.2  ICD-9-CM: 785.1  9/6/2018    Overview Signed 10/18/2018  7:49 AM by Bettie Lanes, MD     9/18 insignificant pericardial effusion, otherwise normal echo             Irritable bowel syndrome with diarrhea ICD-10-CM: K58.0  ICD-9-CM: 564.1  9/20/2016        Desmoid tumor, abdominal incision ICD-10-CM: D48.1  ICD-9-CM: 238.1  2/6/2014        Incisional pain ICD-10-CM: L76.82  ICD-9-CM: 782.0  1/13/2014        Wound infection after surgery ICD-10-CM: T81.49XA  ICD-9-CM: 998.59  11/7/2012        Diverticulitis ICD-10-CM: B51.44  ICD-9-CM: 562.11  9/10/2012              Current Outpatient Medications on File Prior to Visit   Medication Sig    modafinil (PROVIGIL) 100 mg tablet TAKE 1 TABLET BY MOUTH EVERY MORNING    indomethacin (INDOCIN) 25 mg capsule Take 100 mg by mouth as needed.  magnesium 250 mg tab Take  by mouth.  b complex vitamins tablet Take 1 Tab by mouth daily.  Cholecalciferol, Vitamin D3, 3,000 unit tab Take  by mouth.  coenzyme q10 (CO Q-10) 10 mg cap Take  by mouth.  POTASSIUM PO Take  by mouth.  ascorbic acid, vitamin C, (VITAMIN C) 250 mg tablet Take  by mouth.  multivitamin (ONE A DAY) tablet Take 1 Tab by mouth daily.  docosahexanoic acid/epa (FISH OIL PO) Take  by mouth.  QUEtiapine (SEROQUEL) 25 mg tablet TAKE 1 TABLET BY MOUTH AT BEDTIME OR AS DIRECTED     No current facility-administered medications on file prior to visit. CARDIOLOGY STUDIES TO DATE:  9/18 trivial pericardial effusion otherwise normal echo  9/18 event monitor, nsr, sinus tachy, pvcs    Chief Complaint   Patient presents with    Palpitations     HPI :  Since we last met, Ms. Som Veloz has been undergoing functional medicine treatment with Dr. Costa Vivas, which she says includes infusions of vitamin C and some other compounds, as well as she has continued with hyperbaric therapy. Overall, she thinks things are slowly improving, though she still has issues with speech and Raynaud's and at times very, very low energy. The reason she came in today was because yesterday she woke up took her Provigil and then laid back down and slept for three hours. When she awoke, she was very dizzy and out of sorts. She had a little pain in her head, her fingers in her left hand were contracted. She had some chest tightness and she just did not feel well, but she was not sure why. This has come and gone since. In the meantime, she went to see Dr. Shannan Hurd who felt that she was probably having some sort of autonomic flare and I agree that could explain most of the symptoms she was having. It certainly does not sound like a cardiac or primary CNS event by her description. she continues to have palpitations and had some yesterday during this episode. Frequency is much less than when we first met. CARDIAC ROS:   negative for dyspnea, syncope, orthopnea, paroxysmal nocturnal dyspnea, exertional chest pressure/discomfort, claudication, lower extremity edema    Family History   Problem Relation Age of Onset    Hypertension Mother     Stroke Mother     Diabetes Mother     Asthma Neg Hx     Cancer Neg Hx     Anesth Problems Neg Hx        Past Medical History:   Diagnosis Date    Desmoid tumor, abdominal incision 2/6/2014    Diverticulitis     Partial colectomy 2012    Irritable bowel syndrome with diarrhea 9/20/2016    Menopause     Wound infection after surgery 11/7/2012       GENERAL ROS:  A comprehensive review of systems was negative except for that written in the HPI.     Visit Vitals  /76 (BP 1 Location: Left arm, BP Patient Position: Sitting)   Pulse 76   Ht 5' 5\" (1.651 m)   Wt 157 lb 3.2 oz (71.3 kg)   SpO2 99%   BMI 26.16 kg/m²       Wt Readings from Last 3 Encounters:   03/21/19 157 lb 3.2 oz (71.3 kg) 10/18/18 162 lb (73.5 kg)   09/07/18 161 lb (73 kg)            BP Readings from Last 3 Encounters:   03/21/19 116/76   10/18/18 108/70   09/07/18 110/80       PHYSICAL EXAM  General appearance: alert, cooperative, no distress, appears stated age  Neurologic: Alert and oriented X 3  Neck: supple, symmetrical, trachea midline, no adenopathy, no carotid bruit and no JVD  Lungs: clear to auscultation bilaterally  Heart: regular rate and rhythm, S1, S2 normal, no murmur, click, rub or gallop  Extremities: extremities normal, atraumatic, no cyanosis or edema      ASSESSMENT  I am not sure what to make of Ms. Gomez's episode yesterday, though as I mentioned it certainly does not sound like a primary cardiac or neurologic event. Given all of her problems and her exposure with her injury, it is difficult to sort things out. For now, we will continue to observe. We did talk about symptoms that would prompt a return visit here or a call to 911. current treatment plan is effective, no change in therapy  reviewed diet, exercise and weight control    Encounter Diagnoses   Name Primary?  Palpitations Yes    PVC (premature ventricular contraction)      No orders of the defined types were placed in this encounter. Follow-up and Dispositions    · Return if symptoms worsen or fail to improve.          Yoshi Rojas MD  3/21/2019

## 2021-07-06 ENCOUNTER — TRANSCRIBE ORDER (OUTPATIENT)
Dept: SCHEDULING | Age: 61
End: 2021-07-06

## 2021-07-06 DIAGNOSIS — K11.8 SUBMANDIBULAR GLAND MASS: Primary | ICD-10-CM

## 2021-07-09 ENCOUNTER — HOSPITAL ENCOUNTER (OUTPATIENT)
Dept: CT IMAGING | Age: 61
Discharge: HOME | End: 2021-07-09
Attending: DENTIST
Payer: COMMERCIAL

## 2021-07-09 DIAGNOSIS — K11.8 SUBMANDIBULAR GLAND MASS: ICD-10-CM

## 2021-07-09 PROCEDURE — 70492 CT SFT TSUE NCK W/O & W/DYE: CPT

## 2021-07-09 PROCEDURE — 74011000636 HC RX REV CODE- 636: Performed by: RADIOLOGY

## 2021-07-09 RX ADMIN — IOPAMIDOL 100 ML: 612 INJECTION, SOLUTION INTRAVENOUS at 17:17

## 2021-08-31 ENCOUNTER — TRANSCRIBE ORDER (OUTPATIENT)
Dept: REGISTRATION | Age: 61
End: 2021-08-31

## 2021-08-31 DIAGNOSIS — Z01.812 PRE-PROCEDURE LAB EXAM: Primary | ICD-10-CM

## 2021-09-03 ENCOUNTER — HOSPITAL ENCOUNTER (OUTPATIENT)
Dept: PREADMISSION TESTING | Age: 61
Discharge: HOME | End: 2021-09-03
Payer: COMMERCIAL

## 2021-09-03 DIAGNOSIS — Z01.812 PRE-PROCEDURE LAB EXAM: ICD-10-CM

## 2021-09-03 PROCEDURE — U0003 INFECTIOUS AGENT DETECTION BY NUCLEIC ACID (DNA OR RNA); SEVERE ACUTE RESPIRATORY SYNDROME CORONAVIRUS 2 (SARS-COV-2) (CORONAVIRUS DISEASE [COVID-19]), AMPLIFIED PROBE TECHNIQUE, MAKING USE OF HIGH THROUGHPUT TECHNOLOGIES AS DESCRIBED BY CMS-2020-01-R: HCPCS

## 2021-09-05 LAB
SARS-COV-2, XPLCVT: NOT DETECTED
SOURCE, COVRS: NORMAL

## 2021-09-08 ENCOUNTER — ANESTHESIA EVENT (OUTPATIENT)
Dept: SURGERY | Age: 61
End: 2021-09-08
Payer: COMMERCIAL

## 2021-09-09 ENCOUNTER — ANESTHESIA (OUTPATIENT)
Dept: SURGERY | Age: 61
End: 2021-09-09
Payer: COMMERCIAL

## 2021-09-09 ENCOUNTER — HOSPITAL ENCOUNTER (OUTPATIENT)
Age: 61
Setting detail: OUTPATIENT SURGERY
Discharge: HOME | End: 2021-09-09
Attending: DENTIST | Admitting: DENTIST
Payer: COMMERCIAL

## 2021-09-09 VITALS
BODY MASS INDEX: 26.49 KG/M2 | HEIGHT: 65 IN | RESPIRATION RATE: 16 BRPM | SYSTOLIC BLOOD PRESSURE: 148 MMHG | WEIGHT: 159 LBS | HEART RATE: 71 BPM | DIASTOLIC BLOOD PRESSURE: 86 MMHG | OXYGEN SATURATION: 95 % | TEMPERATURE: 97.1 F

## 2021-09-09 PROCEDURE — 77030010507 HC ADH SKN DERMBND J&J -B: Performed by: DENTIST

## 2021-09-09 PROCEDURE — 76010000131 HC OR TIME 2 TO 2.5 HR: Performed by: DENTIST

## 2021-09-09 PROCEDURE — 77030002888 HC SUT CHRMC J&J -A: Performed by: DENTIST

## 2021-09-09 PROCEDURE — 77030008683 HC TU ET CUF COVD -A: Performed by: ANESTHESIOLOGY

## 2021-09-09 PROCEDURE — 74011250636 HC RX REV CODE- 250/636: Performed by: NURSE ANESTHETIST, CERTIFIED REGISTERED

## 2021-09-09 PROCEDURE — 74011000250 HC RX REV CODE- 250: Performed by: NURSE ANESTHETIST, CERTIFIED REGISTERED

## 2021-09-09 PROCEDURE — 76210000020 HC REC RM PH II FIRST 0.5 HR: Performed by: DENTIST

## 2021-09-09 PROCEDURE — 74011250636 HC RX REV CODE- 250/636: Performed by: ANESTHESIOLOGY

## 2021-09-09 PROCEDURE — 77030026438 HC STYL ET INTUB CARD -A: Performed by: ANESTHESIOLOGY

## 2021-09-09 PROCEDURE — 77030002933 HC SUT MCRYL J&J -A: Performed by: DENTIST

## 2021-09-09 PROCEDURE — 2709999900 HC NON-CHARGEABLE SUPPLY: Performed by: DENTIST

## 2021-09-09 PROCEDURE — 77030031139 HC SUT VCRL2 J&J -A: Performed by: DENTIST

## 2021-09-09 PROCEDURE — 76060000036 HC ANESTHESIA 2.5 TO 3 HR: Performed by: DENTIST

## 2021-09-09 PROCEDURE — 77030013705 HC HK ELAS STAY COOP -B: Performed by: DENTIST

## 2021-09-09 PROCEDURE — 76210000016 HC OR PH I REC 1 TO 1.5 HR: Performed by: DENTIST

## 2021-09-09 PROCEDURE — 88307 TISSUE EXAM BY PATHOLOGIST: CPT

## 2021-09-09 PROCEDURE — 74011000250 HC RX REV CODE- 250: Performed by: DENTIST

## 2021-09-09 RX ORDER — SODIUM CHLORIDE, SODIUM LACTATE, POTASSIUM CHLORIDE, CALCIUM CHLORIDE 600; 310; 30; 20 MG/100ML; MG/100ML; MG/100ML; MG/100ML
125 INJECTION, SOLUTION INTRAVENOUS CONTINUOUS
Status: DISCONTINUED | OUTPATIENT
Start: 2021-09-09 | End: 2021-09-09 | Stop reason: HOSPADM

## 2021-09-09 RX ORDER — MORPHINE SULFATE 2 MG/ML
2 INJECTION, SOLUTION INTRAMUSCULAR; INTRAVENOUS
Status: DISCONTINUED | OUTPATIENT
Start: 2021-09-09 | End: 2021-09-09 | Stop reason: HOSPADM

## 2021-09-09 RX ORDER — DEXTROSE, SODIUM CHLORIDE, SODIUM LACTATE, POTASSIUM CHLORIDE, AND CALCIUM CHLORIDE 5; .6; .31; .03; .02 G/100ML; G/100ML; G/100ML; G/100ML; G/100ML
125 INJECTION, SOLUTION INTRAVENOUS CONTINUOUS
Status: DISCONTINUED | OUTPATIENT
Start: 2021-09-09 | End: 2021-09-09 | Stop reason: HOSPADM

## 2021-09-09 RX ORDER — MIDAZOLAM HYDROCHLORIDE 1 MG/ML
1 INJECTION, SOLUTION INTRAMUSCULAR; INTRAVENOUS AS NEEDED
Status: DISCONTINUED | OUTPATIENT
Start: 2021-09-09 | End: 2021-09-09 | Stop reason: HOSPADM

## 2021-09-09 RX ORDER — PROPOFOL 10 MG/ML
INJECTION, EMULSION INTRAVENOUS AS NEEDED
Status: DISCONTINUED | OUTPATIENT
Start: 2021-09-09 | End: 2021-09-09 | Stop reason: HOSPADM

## 2021-09-09 RX ORDER — FENTANYL CITRATE 50 UG/ML
25 INJECTION, SOLUTION INTRAMUSCULAR; INTRAVENOUS
Status: COMPLETED | OUTPATIENT
Start: 2021-09-09 | End: 2021-09-09

## 2021-09-09 RX ORDER — SUCCINYLCHOLINE CHLORIDE 20 MG/ML
INJECTION INTRAMUSCULAR; INTRAVENOUS AS NEEDED
Status: DISCONTINUED | OUTPATIENT
Start: 2021-09-09 | End: 2021-09-09 | Stop reason: HOSPADM

## 2021-09-09 RX ORDER — CEFAZOLIN SODIUM 1 G/3ML
INJECTION, POWDER, FOR SOLUTION INTRAMUSCULAR; INTRAVENOUS AS NEEDED
Status: DISCONTINUED | OUTPATIENT
Start: 2021-09-09 | End: 2021-09-09 | Stop reason: HOSPADM

## 2021-09-09 RX ORDER — LIDOCAINE HYDROCHLORIDE 10 MG/ML
0.5 INJECTION, SOLUTION EPIDURAL; INFILTRATION; INTRACAUDAL; PERINEURAL AS NEEDED
Status: DISCONTINUED | OUTPATIENT
Start: 2021-09-09 | End: 2021-09-09 | Stop reason: HOSPADM

## 2021-09-09 RX ORDER — LIDOCAINE HYDROCHLORIDE AND EPINEPHRINE 10; 10 MG/ML; UG/ML
INJECTION, SOLUTION INFILTRATION; PERINEURAL AS NEEDED
Status: DISCONTINUED | OUTPATIENT
Start: 2021-09-09 | End: 2021-09-09 | Stop reason: HOSPADM

## 2021-09-09 RX ORDER — ONDANSETRON 2 MG/ML
4 INJECTION INTRAMUSCULAR; INTRAVENOUS AS NEEDED
Status: COMPLETED | OUTPATIENT
Start: 2021-09-09 | End: 2021-09-09

## 2021-09-09 RX ORDER — SODIUM CHLORIDE 0.9 % (FLUSH) 0.9 %
5-40 SYRINGE (ML) INJECTION AS NEEDED
Status: DISCONTINUED | OUTPATIENT
Start: 2021-09-09 | End: 2021-09-09 | Stop reason: HOSPADM

## 2021-09-09 RX ORDER — OXYCODONE HYDROCHLORIDE 5 MG/1
5 TABLET ORAL AS NEEDED
Status: DISCONTINUED | OUTPATIENT
Start: 2021-09-09 | End: 2021-09-09 | Stop reason: HOSPADM

## 2021-09-09 RX ORDER — MIDAZOLAM HYDROCHLORIDE 1 MG/ML
1 INJECTION, SOLUTION INTRAMUSCULAR; INTRAVENOUS
Status: DISCONTINUED | OUTPATIENT
Start: 2021-09-09 | End: 2021-09-09 | Stop reason: HOSPADM

## 2021-09-09 RX ORDER — SODIUM CHLORIDE 0.9 % (FLUSH) 0.9 %
5-40 SYRINGE (ML) INJECTION EVERY 8 HOURS
Status: DISCONTINUED | OUTPATIENT
Start: 2021-09-09 | End: 2021-09-09 | Stop reason: HOSPADM

## 2021-09-09 RX ORDER — FENTANYL CITRATE 50 UG/ML
50 INJECTION, SOLUTION INTRAMUSCULAR; INTRAVENOUS AS NEEDED
Status: DISCONTINUED | OUTPATIENT
Start: 2021-09-09 | End: 2021-09-09 | Stop reason: HOSPADM

## 2021-09-09 RX ORDER — ACETAMINOPHEN 325 MG/1
650 TABLET ORAL ONCE
Status: DISCONTINUED | OUTPATIENT
Start: 2021-09-09 | End: 2021-09-09 | Stop reason: HOSPADM

## 2021-09-09 RX ORDER — FENTANYL CITRATE 50 UG/ML
INJECTION, SOLUTION INTRAMUSCULAR; INTRAVENOUS AS NEEDED
Status: DISCONTINUED | OUTPATIENT
Start: 2021-09-09 | End: 2021-09-09 | Stop reason: HOSPADM

## 2021-09-09 RX ORDER — DIPHENHYDRAMINE HYDROCHLORIDE 50 MG/ML
12.5 INJECTION, SOLUTION INTRAMUSCULAR; INTRAVENOUS AS NEEDED
Status: DISCONTINUED | OUTPATIENT
Start: 2021-09-09 | End: 2021-09-09 | Stop reason: HOSPADM

## 2021-09-09 RX ORDER — KETOROLAC TROMETHAMINE 30 MG/ML
15 INJECTION, SOLUTION INTRAMUSCULAR; INTRAVENOUS
Status: COMPLETED | OUTPATIENT
Start: 2021-09-09 | End: 2021-09-09

## 2021-09-09 RX ORDER — KETOROLAC TROMETHAMINE 30 MG/ML
INJECTION, SOLUTION INTRAMUSCULAR; INTRAVENOUS
Status: DISCONTINUED
Start: 2021-09-09 | End: 2021-09-09 | Stop reason: HOSPADM

## 2021-09-09 RX ORDER — LIDOCAINE HYDROCHLORIDE 20 MG/ML
INJECTION, SOLUTION EPIDURAL; INFILTRATION; INTRACAUDAL; PERINEURAL AS NEEDED
Status: DISCONTINUED | OUTPATIENT
Start: 2021-09-09 | End: 2021-09-09 | Stop reason: HOSPADM

## 2021-09-09 RX ORDER — SODIUM CHLORIDE, SODIUM LACTATE, POTASSIUM CHLORIDE, CALCIUM CHLORIDE 600; 310; 30; 20 MG/100ML; MG/100ML; MG/100ML; MG/100ML
INJECTION, SOLUTION INTRAVENOUS
Status: DISCONTINUED | OUTPATIENT
Start: 2021-09-09 | End: 2021-09-09 | Stop reason: HOSPADM

## 2021-09-09 RX ORDER — ONDANSETRON 2 MG/ML
INJECTION INTRAMUSCULAR; INTRAVENOUS AS NEEDED
Status: DISCONTINUED | OUTPATIENT
Start: 2021-09-09 | End: 2021-09-09 | Stop reason: HOSPADM

## 2021-09-09 RX ORDER — DEXMEDETOMIDINE HYDROCHLORIDE 100 UG/ML
INJECTION, SOLUTION INTRAVENOUS AS NEEDED
Status: DISCONTINUED | OUTPATIENT
Start: 2021-09-09 | End: 2021-09-09 | Stop reason: HOSPADM

## 2021-09-09 RX ADMIN — LIDOCAINE HYDROCHLORIDE 100 MG: 20 INJECTION, SOLUTION EPIDURAL; INFILTRATION; INTRACAUDAL; PERINEURAL at 15:40

## 2021-09-09 RX ADMIN — FENTANYL CITRATE 50 MCG: 50 INJECTION, SOLUTION INTRAMUSCULAR; INTRAVENOUS at 15:32

## 2021-09-09 RX ADMIN — FENTANYL CITRATE 50 MCG: 50 INJECTION, SOLUTION INTRAMUSCULAR; INTRAVENOUS at 15:40

## 2021-09-09 RX ADMIN — MEPERIDINE HYDROCHLORIDE 12.5 MG: 25 INJECTION INTRAMUSCULAR; INTRAVENOUS; SUBCUTANEOUS at 18:15

## 2021-09-09 RX ADMIN — DEXMEDETOMIDINE HYDROCHLORIDE 8 MCG: 100 INJECTION, SOLUTION, CONCENTRATE INTRAVENOUS at 16:49

## 2021-09-09 RX ADMIN — DIPHENHYDRAMINE HYDROCHLORIDE 12.5 MG: 50 INJECTION INTRAMUSCULAR; INTRAVENOUS at 19:21

## 2021-09-09 RX ADMIN — SUCCINYLCHOLINE CHLORIDE 120 MG: 20 INJECTION, SOLUTION INTRAMUSCULAR; INTRAVENOUS at 15:40

## 2021-09-09 RX ADMIN — DEXMEDETOMIDINE HYDROCHLORIDE 8 MCG: 100 INJECTION, SOLUTION, CONCENTRATE INTRAVENOUS at 16:43

## 2021-09-09 RX ADMIN — PROPOFOL 30 MG: 10 INJECTION, EMULSION INTRAVENOUS at 15:46

## 2021-09-09 RX ADMIN — PROPOFOL 200 MG: 10 INJECTION, EMULSION INTRAVENOUS at 15:40

## 2021-09-09 RX ADMIN — ONDANSETRON 4 MG: 2 INJECTION INTRAMUSCULAR; INTRAVENOUS at 19:00

## 2021-09-09 RX ADMIN — FENTANYL CITRATE 25 MCG: 0.05 INJECTION, SOLUTION INTRAMUSCULAR; INTRAVENOUS at 18:10

## 2021-09-09 RX ADMIN — ONDANSETRON 4 MG: 2 INJECTION INTRAMUSCULAR; INTRAVENOUS at 18:27

## 2021-09-09 RX ADMIN — SODIUM CHLORIDE, POTASSIUM CHLORIDE, SODIUM LACTATE AND CALCIUM CHLORIDE: 600; 310; 30; 20 INJECTION, SOLUTION INTRAVENOUS at 15:17

## 2021-09-09 RX ADMIN — SODIUM CHLORIDE, POTASSIUM CHLORIDE, SODIUM LACTATE AND CALCIUM CHLORIDE 125 ML/HR: 600; 310; 30; 20 INJECTION, SOLUTION INTRAVENOUS at 14:29

## 2021-09-09 RX ADMIN — FENTANYL CITRATE 25 MCG: 0.05 INJECTION, SOLUTION INTRAMUSCULAR; INTRAVENOUS at 18:05

## 2021-09-09 RX ADMIN — FENTANYL CITRATE 50 MCG: 50 INJECTION, SOLUTION INTRAMUSCULAR; INTRAVENOUS at 18:00

## 2021-09-09 RX ADMIN — ONDANSETRON HYDROCHLORIDE 4 MG: 2 INJECTION, SOLUTION INTRAMUSCULAR; INTRAVENOUS at 15:40

## 2021-09-09 RX ADMIN — FENTANYL CITRATE 50 MCG: 50 INJECTION, SOLUTION INTRAMUSCULAR; INTRAVENOUS at 17:58

## 2021-09-09 RX ADMIN — KETOROLAC TROMETHAMINE 15 MG: 30 INJECTION, SOLUTION INTRAMUSCULAR; INTRAVENOUS at 19:05

## 2021-09-09 RX ADMIN — FENTANYL CITRATE 25 MCG: 0.05 INJECTION, SOLUTION INTRAMUSCULAR; INTRAVENOUS at 18:25

## 2021-09-09 RX ADMIN — CEFAZOLIN 2 G: 330 INJECTION, POWDER, FOR SOLUTION INTRAMUSCULAR; INTRAVENOUS at 15:51

## 2021-09-09 RX ADMIN — FENTANYL CITRATE 25 MCG: 0.05 INJECTION, SOLUTION INTRAMUSCULAR; INTRAVENOUS at 18:20

## 2021-09-09 NOTE — BRIEF OP NOTE
Brief Postoperative Note    Patient: Edi Garcia  YOB: 1960  MRN: [id number]    Date of Procedure: 9/9/2021     Pre-Op Diagnosis: SIALOLITHIASIS    Post-Op Diagnosis: Same as preoperative diagnosis.       Procedure(s):  EXCISION SUBMANDIBULAR GLAND    Surgeon(s):  Miguel Ángel Menchaca DDS Boxx, Charles, DDS    Surgical Assistant: None    Anesthesia: General     Estimated Blood Loss (mL): less than 50     Complications: None    Specimens:   ID Type Source Tests Collected by Time Destination   1 : Submandibular gland Fresh Mandible  Miguel Ángel Menchaca DDS 9/9/2021 1727 Pathology        Implants: * No implants in log *    Drains: * No LDAs found *    Findings: enlarged left submandibular gland and sialolithiasis    Electronically Signed by Brian Johnson DDS on 9/9/2021 at 6:39 PM

## 2021-09-09 NOTE — PERIOP NOTES
Discharge instructions and medications reviewed with patient and  Suresh Muñoz. Opportunity was given for patient and responsible party to ask questions. No further questions at this time. Responsible party and patient verbalizes understanding of discharge instructions. A Copy of discharge instructions given to patient. Patient discharged with all belongings.

## 2021-09-09 NOTE — ANESTHESIA POSTPROCEDURE EVALUATION
Post-Anesthesia Evaluation and Assessment    Patient: Karen Ahumada MRN: [id number]  SSN: [id number]    YOB: 1960  Age: 63 y.o. Sex: female      I have evaluated the patient and they are stable and ready for discharge from the PACU. Cardiovascular Function/Vital Signs  Visit Vitals  BP (!) 139/91   Pulse 75   Temp 36.8 °C (98.2 °F)   Resp 13   Ht 5' 5\" (1.651 m)   Wt 72.1 kg (159 lb)   SpO2 95%   BMI 26.46 kg/m²       Patient is status post General anesthesia for Procedure(s):  EXCISION SUBMANDIBULAR GLAND. Nausea/Vomiting: None    Postoperative hydration reviewed and adequate. Pain:  Pain Scale 1: Adult Nonverbal Pain Scale (09/09/21 1830)  Pain Intensity 1: 8 (09/09/21 1825)   Managed    Neurological Status:   Neuro (WDL): Exceptions to WDL (09/09/21 1805)  Neuro  Neurologic State: Drowsy (easily arousable) (09/09/21 1805)  Orientation Level: Oriented X4 (09/09/21 1805)   At baseline    Mental Status, Level of Consciousness: Alert and  oriented to person, place, and time    Pulmonary Status:   O2 Device: Nasal cannula (09/09/21 1805)   Adequate oxygenation and airway patent    Complications related to anesthesia: None    Post-anesthesia assessment completed. No concerns    Signed By: Oswaldo Neely MD     September 9, 2021              Procedure(s):  EXCISION SUBMANDIBULAR GLAND. general    <BSHSIANPOST>    INITIAL Post-op Vital signs:   Vitals Value Taken Time   /91 09/09/21 1900   Temp 36.8 °C (98.2 °F) 09/09/21 1805   Pulse 75 09/09/21 1916   Resp 14 09/09/21 1916   SpO2 96 % 09/09/21 1916   Vitals shown include unvalidated device data.

## 2021-09-09 NOTE — ANESTHESIA PREPROCEDURE EVALUATION
Anesthetic History   No history of anesthetic complications            Review of Systems / Medical History  Patient summary reviewed, nursing notes reviewed and pertinent labs reviewed    Pulmonary  Within defined limits                 Neuro/Psych   Within defined limits           Cardiovascular  Within defined limits                     GI/Hepatic/Renal  Within defined limits              Endo/Other  Within defined limits           Other Findings              Physical Exam    Airway  Mallampati: II  TM Distance: > 6 cm  Neck ROM: normal range of motion   Mouth opening: Normal     Cardiovascular  Regular rate and rhythm,  S1 and S2 normal,  no murmur, click, rub, or gallop             Dental  No notable dental hx       Pulmonary  Breath sounds clear to auscultation               Abdominal  GI exam deferred       Other Findings            Anesthetic Plan    ASA: 1  Anesthesia type: general          Induction: Intravenous  Anesthetic plan and risks discussed with: Patient Detail Level: Detailed

## 2021-09-09 NOTE — DISCHARGE INSTRUCTIONS
Patient Education      She had toradol at 7:05pm  Submandibular Gland Removal: What to Expect at Home  Your Recovery  Submandibular gland removal is surgery to take out a saliva gland below the lower jaw. The gland may have been removed because of infection, a tumor, or a blocked saliva duct. A saliva duct is a tube that carries saliva from the gland into the mouth. The area below your jaw may be sore for several days after your surgery. The area also may be slightly swollen or bruised. It will probably take 1 to 2 weeks for the cut (incision) to heal.  If you have stitches in your incision, your doctor may need to remove them, or they may dissolve on their own. Ask your doctor about this. If your incision was closed with glue, the glue will peel off on its own in the weeks after your surgery. This care sheet gives you a general idea about how long it will take for you to recover. But each person recovers at a different pace. Follow the steps below to get better as quickly as possible. How can you care for yourself at home? Activity    · Rest when you feel tired. Getting enough sleep will help you recover. For 4 or 5 days after surgery, sleep with your head up by using two or three pillows. You can also try to sleep with your head up in a reclining chair.     · Try to walk each day. Start by walking a little more than you did the day before. Bit by bit, increase the amount you walk. Walking boosts blood flow and helps prevent pneumonia and constipation.     · Avoid strenuous activities, such as bicycle riding, jogging, weight lifting, or aerobic exercise, for 1 week or until your doctor says it is okay.     · For 1 week, avoid lifting anything that would make you strain.  This may include a child, heavy grocery bags and milk containers, a heavy briefcase or backpack, cat litter or dog food bags, or a vacuum .     · Ask your doctor when you can drive again.     · You will probably need to take 1 week off from work. It depends on the type of work you do and how you feel.     · Do not shave the incision for the first 2 weeks or until your doctor says it is okay. It is okay to shave the rest of your neck and face. Diet    · You can eat your normal diet. If your stomach is upset, try bland, low-fat foods like plain rice, broiled chicken, toast, and yogurt.     · Drink plenty of fluids to avoid becoming dehydrated.     · You may notice that your bowel movements are not regular right after your surgery. This is common. Try to avoid constipation and straining with bowel movements. You may want to take a fiber supplement every day. If you have not had a bowel movement after a couple of days, ask your doctor about taking a mild laxative. Medicines    · Your doctor will tell you if and when you can restart your medicines. He or she will also give you instructions about taking any new medicines.     · If you take aspirin or some other blood thinner, ask your doctor if and when to start taking it again. Make sure that you understand exactly what your doctor wants you to do.     · Be safe with medicines. Take pain medicines exactly as directed. ? If the doctor gave you a prescription medicine for pain, take it as prescribed. ? If you are not taking a prescription pain medicine, ask your doctor if you can take an over-the-counter medicine.     · If you think your pain medicine is making you sick to your stomach:  ? Take your medicine after meals (unless your doctor has told you not to). ? Ask your doctor for a different pain medicine.     · If your doctor prescribed antibiotics, take them as directed. Do not stop taking them just because you feel better. You need to take the full course of antibiotics. Incision care    · You may have a bandage over the incision.  Follow your doctor's instructions about how to take care of this bandage and when to take it off.     · If you have strips of tape on the incision, leave the tape on for a week or until it falls off.     · After your doctor says it is okay to get the incision wet, wash the area daily with warm, soapy water, and pat it dry. You may cover the area with a gauze bandage if it weeps or rubs against clothing. Change the bandage every day.     · Your doctor may give you other instructions about how to care for your incision. Follow your doctor's instructions exactly.     · Keep the area clean and dry. Ice    · Put ice or a cold pack on the surgery site for 10 to 20 minutes at a time. Try to do this every 1 to 2 hours for the next 3 days (when you are awake) or until the swelling goes down. Put a thin cloth between the ice and your skin. Follow-up care is a key part of your treatment and safety. Be sure to make and go to all appointments, and call your doctor if you are having problems. It's also a good idea to know your test results and keep a list of the medicines you take. When should you call for help? Call 911 anytime you think you may need emergency care. For example, call if:    · You passed out (lost consciousness).     · You have severe trouble breathing.     · You have sudden chest pain, shortness of breath, or you cough up blood. Call your doctor now or seek immediate medical care if:    · You are sick to your stomach or cannot keep fluids down.     · You have pain that does not get better after you take pain medicine.     · You have loose stitches, or your incision comes open.     · You bleed through your bandage.     · You have signs of infection, such as:  ? Increased pain, swelling, warmth, or redness. ? Red streaks leading from the incision. ? Pus draining from the incision. ? A fever. Watch closely for any changes in your health, and be sure to contact your doctor if:    · You do not get better as expected. Where can you learn more?   Go to http://www.gray.com/  Enter N285 in the search box to learn more about \"Submandibular Gland Removal: What to Expect at Home. \"  Current as of: December 2, 2020               Content Version: 12.8  © 2006-2021 [employer]. Care instructions adapted under license by [employer] (which disclaims liability or warranty for this information). If you have questions about a medical condition or this instruction, always ask your healthcare professional. [address] any warranty or liability for your use of this information. ______________________________________________________________________    Anesthesia Discharge Instructions    After general anesthesia or intervenous sedation, for 24 hours or while taking prescription Narcotics:  · Limit your activities  · Do not drive or operate hazardous machinery  · If you have not urinated within 8 hours after discharge, please contact your surgeon on call. · Do not make important personal or business decisions  · Do not drink alcoholic beverages    Report the following to your surgeon:  · Excessive pain, swelling, redness or odor of or around the surgical area  · Temperature over 100.5 degrees  · Nausea and vomiting lasting longer than 4 hours or if unable to take medication  · Any signs of decreased circulation or nerve impairment to extremity:  Change in color, persistent numbness, tingling, coldness or increased pain.   · Any questions

## 2021-09-10 NOTE — OP NOTES
[address] [facility]  OPERATIVE REPORT    Name:  Melva Stephenson  MR#:  [id number]  :  1960  ACCOUNT #:  [de-identified]  DATE OF SERVICE:  2021    PROCEDURE START TIME:  Approximately 1600. PROCEDURE END TIME:  Approximately 1800. PREOPERATIVE DIAGNOSIS:  Left submandibular gland sialolithiasis and sialadenitis. POSTOPERATIVE DIAGNOSIS:  Left submandibular gland sialolithiasis and sialadenitis. PROCEDURE PERFORMED:  Removal of left submandibular gland. SURGEON:  Miguel Ángel Rodriguez DDS    ASSISTANT:  Cyndi Prieto MD    ANESTHESIA:  General anesthesia. COMPLICATIONS:  None. SPECIMENS REMOVED:  Left submandibular gland. IMPLANTS: None    ESTIMATED BLOOD LOSS:  Less than 50 mL. DRAINS AND TUBES:  None. INDICATIONS:  This is a 18-year-old female who presented to my office with pain from the left submandibular gland region with intermittent swelling. A CT scan was taken with contrast that showed a stone that was calcified within the left submandibular gland hilum, resulting in mild dilatation of the gland as well. Based on location of the stones and the recurrence of intermittent swelling, a decision was made to proceed with removal of the gland. Discussion with the patient including risks, benefits, and alternatives were discussed. The patient opted and agreed to remove the gland. PROCEDURE IN DETAIL:  The patient was identified in the preoperative area. Risks, benefits, and alternatives were reviewed and consent was obtained. The patient was transferred to the operating room by the Anesthesia team and the patient was transferred from her gurney to the operating room table without any complications. General anesthesia was induced using an oral-Ray that was secured per the Anesthesia team to the right portion of the mouth. The patient was then turned 90 degrees in supine position.   At this point in time, the patient's neck and face was prepped and draped in the usual Oral-Maxillofacial surgery fashion. A marker was then used to agus the outline of the left mandibular inferior border as well as the left angle to an ascending ramus. Roughly 3 cm below the inferior border of the mandible was also marked with a marking pen. Incision was outlined approximately 4.5 cm in size. Incision was made through the skin using a 15 blade. Further dissection was carried out through the subcutaneous fascia and platysmal layer. Blunt dissection using a mosquito hemostat was carried out in a layered fashion. The superficial layer of deep cervical fascia was identified. Subsequently, the facial vein was identified and ligated with 3-0 chromic gut. At this point, the submandibular gland was identified. Blunt and sharp dissection performed to separate the gland from the surrounding tissue ensuring not to violate blood vessels and nerve structures. Lingual nerve was identified within the stalk of the gland. This was bluntly  from the gland. At this point in time, the only structure retained in the gland was a duct. A 3-0 silk was used to ligate the most distal aspect of the duct and the duct was  using a Bovie. At this point in time, the glands were inspected and noted to have several sialoliths. Area was thoroughly irrigated with normal saline. The incision was closed in a layered fashion using 4-0 Vicryl and 5-0 Monocryl for the skin. Dermabond was used to cover the incision. Fluff was used as a pressure dressing and Tensoplast dressing was used as a pressure dressing as well. The patient was then turned to the Anesthesia's care, where she was extubated and awakened without any complications. All needle counts and sponge counts were correct x2.       EDIE Whitney/[email or web address]  D:  09/09/2021 18:33  T:  09/10/2021 7:29  JOB #:  [id number]  CC:  Demetrio Whitt DDS

## 2022-01-27 ENCOUNTER — TRANSCRIBE ORDER (OUTPATIENT)
Dept: SCHEDULING | Age: 62
End: 2022-01-27

## 2022-01-27 DIAGNOSIS — Z12.31 VISIT FOR SCREENING MAMMOGRAM: Primary | ICD-10-CM

## 2022-03-07 ENCOUNTER — HOSPITAL ENCOUNTER (OUTPATIENT)
Dept: MAMMOGRAPHY | Age: 62
Discharge: HOME | End: 2022-03-07
Attending: NURSE PRACTITIONER
Payer: COMMERCIAL

## 2022-03-07 DIAGNOSIS — Z12.31 VISIT FOR SCREENING MAMMOGRAM: ICD-10-CM

## 2022-03-07 PROCEDURE — 77067 SCR MAMMO BI INCL CAD: CPT

## 2022-03-19 PROBLEM — R00.2 PALPITATIONS: Status: ACTIVE | Noted: 2018-09-06

## 2022-04-12 ENCOUNTER — ANESTHESIA EVENT (OUTPATIENT)
Dept: ENDOSCOPY | Age: 62
End: 2022-04-12
Payer: COMMERCIAL

## 2022-04-12 ENCOUNTER — HOSPITAL ENCOUNTER (OUTPATIENT)
Age: 62
Setting detail: OUTPATIENT SURGERY
Discharge: HOME | End: 2022-04-12
Attending: INTERNAL MEDICINE | Admitting: INTERNAL MEDICINE
Payer: COMMERCIAL

## 2022-04-12 ENCOUNTER — ANESTHESIA (OUTPATIENT)
Dept: ENDOSCOPY | Age: 62
End: 2022-04-12
Payer: COMMERCIAL

## 2022-04-12 VITALS
WEIGHT: 158 LBS | HEIGHT: 65 IN | DIASTOLIC BLOOD PRESSURE: 84 MMHG | RESPIRATION RATE: 16 BRPM | TEMPERATURE: 98.6 F | HEART RATE: 66 BPM | SYSTOLIC BLOOD PRESSURE: 136 MMHG | BODY MASS INDEX: 26.33 KG/M2 | OXYGEN SATURATION: 100 %

## 2022-04-12 PROCEDURE — 76040000019: Performed by: INTERNAL MEDICINE

## 2022-04-12 PROCEDURE — 74011250637 HC RX REV CODE- 250/637: Performed by: INTERNAL MEDICINE

## 2022-04-12 PROCEDURE — 76060000031 HC ANESTHESIA FIRST 0.5 HR: Performed by: INTERNAL MEDICINE

## 2022-04-12 PROCEDURE — 74011000250 HC RX REV CODE- 250: Performed by: NURSE ANESTHETIST, CERTIFIED REGISTERED

## 2022-04-12 PROCEDURE — 74011250636 HC RX REV CODE- 250/636: Performed by: NURSE ANESTHETIST, CERTIFIED REGISTERED

## 2022-04-12 RX ORDER — ATROPINE SULFATE 0.1 MG/ML
0.5 INJECTION INTRAVENOUS
Status: DISCONTINUED | OUTPATIENT
Start: 2022-04-12 | End: 2022-04-12 | Stop reason: HOSPADM

## 2022-04-12 RX ORDER — NALOXONE HYDROCHLORIDE 0.4 MG/ML
0.4 INJECTION, SOLUTION INTRAMUSCULAR; INTRAVENOUS; SUBCUTANEOUS
Status: DISCONTINUED | OUTPATIENT
Start: 2022-04-12 | End: 2022-04-12 | Stop reason: HOSPADM

## 2022-04-12 RX ORDER — SODIUM CHLORIDE 0.9 % (FLUSH) 0.9 %
5-40 SYRINGE (ML) INJECTION AS NEEDED
Status: DISCONTINUED | OUTPATIENT
Start: 2022-04-12 | End: 2022-04-12 | Stop reason: HOSPADM

## 2022-04-12 RX ORDER — DEXTROMETHORPHAN/PSEUDOEPHED 2.5-7.5/.8
1.2 DROPS ORAL
Status: DISCONTINUED | OUTPATIENT
Start: 2022-04-12 | End: 2022-04-12 | Stop reason: HOSPADM

## 2022-04-12 RX ORDER — SODIUM CHLORIDE 9 MG/ML
INJECTION, SOLUTION INTRAVENOUS
Status: DISCONTINUED | OUTPATIENT
Start: 2022-04-12 | End: 2022-04-12 | Stop reason: HOSPADM

## 2022-04-12 RX ORDER — LIDOCAINE HYDROCHLORIDE 20 MG/ML
INJECTION, SOLUTION EPIDURAL; INFILTRATION; INTRACAUDAL; PERINEURAL AS NEEDED
Status: DISCONTINUED | OUTPATIENT
Start: 2022-04-12 | End: 2022-04-12 | Stop reason: HOSPADM

## 2022-04-12 RX ORDER — EPINEPHRINE 0.1 MG/ML
1 INJECTION INTRACARDIAC; INTRAVENOUS
Status: DISCONTINUED | OUTPATIENT
Start: 2022-04-12 | End: 2022-04-12 | Stop reason: HOSPADM

## 2022-04-12 RX ORDER — SODIUM CHLORIDE 9 MG/ML
50 INJECTION, SOLUTION INTRAVENOUS CONTINUOUS
Status: DISCONTINUED | OUTPATIENT
Start: 2022-04-12 | End: 2022-04-12 | Stop reason: HOSPADM

## 2022-04-12 RX ORDER — MIDAZOLAM HYDROCHLORIDE 1 MG/ML
.25-5 INJECTION, SOLUTION INTRAMUSCULAR; INTRAVENOUS
Status: DISCONTINUED | OUTPATIENT
Start: 2022-04-12 | End: 2022-04-12 | Stop reason: HOSPADM

## 2022-04-12 RX ORDER — FLUMAZENIL 0.1 MG/ML
0.2 INJECTION INTRAVENOUS
Status: DISCONTINUED | OUTPATIENT
Start: 2022-04-12 | End: 2022-04-12 | Stop reason: HOSPADM

## 2022-04-12 RX ORDER — FENTANYL CITRATE 50 UG/ML
25-200 INJECTION, SOLUTION INTRAMUSCULAR; INTRAVENOUS
Status: DISCONTINUED | OUTPATIENT
Start: 2022-04-12 | End: 2022-04-12 | Stop reason: HOSPADM

## 2022-04-12 RX ORDER — SODIUM CHLORIDE 0.9 % (FLUSH) 0.9 %
5-40 SYRINGE (ML) INJECTION EVERY 8 HOURS
Status: DISCONTINUED | OUTPATIENT
Start: 2022-04-12 | End: 2022-04-12 | Stop reason: HOSPADM

## 2022-04-12 RX ORDER — PROPOFOL 10 MG/ML
INJECTION, EMULSION INTRAVENOUS AS NEEDED
Status: DISCONTINUED | OUTPATIENT
Start: 2022-04-12 | End: 2022-04-12 | Stop reason: HOSPADM

## 2022-04-12 RX ADMIN — PROPOFOL 50 MG: 10 INJECTION, EMULSION INTRAVENOUS at 08:07

## 2022-04-12 RX ADMIN — LIDOCAINE HYDROCHLORIDE 40 MG: 20 INJECTION, SOLUTION EPIDURAL; INFILTRATION; INTRACAUDAL; PERINEURAL at 08:04

## 2022-04-12 RX ADMIN — PROPOFOL 30 MG: 10 INJECTION, EMULSION INTRAVENOUS at 08:13

## 2022-04-12 RX ADMIN — PROPOFOL 100 MG: 10 INJECTION, EMULSION INTRAVENOUS at 08:04

## 2022-04-12 RX ADMIN — SODIUM CHLORIDE: 900 INJECTION, SOLUTION INTRAVENOUS at 08:04

## 2022-04-12 RX ADMIN — PROPOFOL 40 MG: 10 INJECTION, EMULSION INTRAVENOUS at 08:16

## 2022-04-12 RX ADMIN — PROPOFOL 30 MG: 10 INJECTION, EMULSION INTRAVENOUS at 08:10

## 2022-04-12 NOTE — PROCEDURES
[address]  [place], [address]      Colonoscopy Operative Report    Marielena Dueñas  [id number]  1960      Procedure Type:   Colonoscopy --diagnostic     Indications:    Personal history of colon polyps (screening only)       Pre-operative Diagnosis: see indication above    Post-operative Diagnosis:  See findings below    :  Francy Prado MD    Surgical Assistant: Endoscopy Technician-1: Emmett Hayes  Endoscopy RN-1: Ryan Yo RN    Implants:  None    Referring Provider: Raghavendra Oconnor MD      Sedation:  MAC anesthesia Propofol      Procedure Details:  After informed consent was obtained with all risks and benefits of procedure explained and preoperative exam completed, the patient was taken to the endoscopy suite and placed in the left lateral decubitus position. Upon sequential sedation as per above, a digital rectal exam was performed demonstrating internal hemorrhoids. The Olympus videocolonoscope  was inserted in the rectum and carefully advanced to the cecum, which was identified by the appendiceal orifice. The cecum was identified by the ileocecal valve and appendiceal orifice. The quality of preparation was good. The colonoscope was slowly withdrawn with careful evaluation between folds. Retroflexion in the rectum was completed . Findings:   Rectum: normal  Sigmoid: normal surgical anastomosis at 20 cm from anus    Moderate diverticulosis     Descending Colon: mild diverticulosis  Transverse Colon: mild diverticulosis    Ascending Colon: normal  Cecum: normal        Specimen Removed:  none    Complications: None. EBL:  None. Impression:    see findings    Recommendations: --Repeat colonoscopy in 5 years.     High fiber diet  Signed By: Francy Prado MD     4/12/2022  8:26 AM

## 2022-04-12 NOTE — H&P
[address], [address] [place], [place] [address]      History and Physical       NAME:  Claire Phelan   :   1960   MRN:   [id number]             History of Present Illness:  Patient is a 57 y.o. who is seen for screening colonoscopy . PMH:  Past Medical History:   Diagnosis Date    Desmoid tumor, abdominal incision 2014    Diverticulitis     Partial colectomy     Irritable bowel syndrome with diarrhea 2016    Menopause     Wound infection after surgery 2012       PSH:  Past Surgical History:   Procedure Laterality Date    HX CHOLECYSTECTOMY      HX DILATION AND CURETTAGE      HX GI      COLONOSCOPY    HX GYN      ECTOPIC PREGNANCIES X2    HX OTHER SURGICAL  10-25-12    Lap assisted sig colectomy-Dr. Chaparrita Lepe[facility]    HX OTHER SURGICAL  14    biopsy of spindle cell neoplasm - [facility]- Dr. Silvia Lauren  2014    wide excision of desmoid tumor of abdominal wall-[facility]-DR. Chaparrita Lepe    OR APPENDECTOMY         Allergies: Allergies   Allergen Reactions    Compazine [Prochlorperazine Edisylate] Other (comments)     Tardive dyskinesia    Red Dye Hives       Home Medications:  Prior to Admission Medications   Prescriptions Last Dose Informant Patient Reported? Taking? Cholecalciferol, Vitamin D3, 3,000 unit tab   Yes No   Sig: Take  by mouth. Patient not taking: Reported on 2021   POTASSIUM PO   Yes No   Sig: Take  by mouth. Patient not taking: Reported on 2021   QUEtiapine (SEROQUEL) 25 mg tablet   No No   Sig: TAKE 1 TABLET BY MOUTH AT BEDTIME OR AS DIRECTED   albuterol (PROVENTIL HFA, VENTOLIN HFA, PROAIR HFA) 90 mcg/actuation inhaler   No No   Sig: INHALE 2 PUFFS BY MOUTH 4 TIMES DAILY AS NEEDED FOR WHEEZING OR SHORTNESS OF BREATH. * DUE FOR APPT   ascorbic acid, vitamin C, (VITAMIN C) 250 mg tablet   Yes No   Sig: Take  by mouth.    Patient not taking: Reported on 2021   b complex vitamins tablet   Yes No Sig: Take 1 Tab by mouth daily. Patient not taking: Reported on 9/9/2021   coenzyme q10 (CO Q-10) 10 mg cap   Yes No   Sig: Take  by mouth. Patient not taking: Reported on 9/9/2021   docosahexanoic acid/epa (FISH OIL PO)   Yes No   Sig: Take  by mouth. indomethacin (INDOCIN) 25 mg capsule   Yes No   Sig: Take 100 mg by mouth as needed. Patient not taking: Reported on 9/9/2021   magnesium 250 mg tab   Yes No   Sig: Take  by mouth.   modafinil (PROVIGIL) 100 mg tablet   Yes No   Sig: TAKE 1 TABLET BY MOUTH EVERY MORNING   Patient not taking: Reported on 9/9/2021   multivitamin (ONE A DAY) tablet   Yes No   Sig: Take 1 Tab by mouth daily.       Facility-Administered Medications: None       Hospital Medications:  Current Facility-Administered Medications   Medication Dose Route Frequency    0.9% sodium chloride infusion  50 mL/hr IntraVENous CONTINUOUS    sodium chloride (NS) flush 5-40 mL  5-40 mL IntraVENous Q8H    sodium chloride (NS) flush 5-40 mL  5-40 mL IntraVENous PRN    midazolam (VERSED) injection 0.25-5 mg  0.25-5 mg IntraVENous Multiple    fentaNYL citrate (PF) injection  mcg   mcg IntraVENous Multiple    naloxone (NARCAN) injection 0.4 mg  0.4 mg IntraVENous Multiple    flumazeniL (ROMAZICON) 0.1 mg/mL injection 0.2 mg  0.2 mg IntraVENous Multiple    simethicone (MYLICON) 63KO/9.5JF oral drops 80 mg  1.2 mL Oral Multiple    atropine injection 0.5 mg  0.5 mg IntraVENous ONCE PRN    EPINEPHrine (ADRENALIN) 0.1 mg/mL syringe 1 mg  1 mg Endoscopically ONCE PRN       Social History:  Social History     Tobacco Use    Smoking status: Never Smoker    Smokeless tobacco: Never Used   Substance Use Topics    Alcohol use: Yes     Comment: socially       Family History:  Family History   Problem Relation Age of Onset    Hypertension Mother     Stroke Mother     Diabetes Mother     Asthma Neg Hx     Cancer Neg Hx     Anesth Problems Neg Hx          The patient was counseled at length about the risks of alla Covid-19 in the jennifer-operative and post-operative states including the recovery window of their procedure. The patient was made aware that alla Covid-19 after a surgical procedure may worsen their prognosis for recovering from the virus and lend to a higher morbidity and or mortality risk. The patient was given the options of postponing their procedure. All of the risks, benefits, and alternatives were discussed. The patient does  wish to proceed with the procedure. Review of Systems:      Constitutional: negative fever, negative chills, negative weight loss  Eyes:   negative visual changes  ENT:   negative sore throat, tongue or lip swelling  Respiratory:  negative cough, negative dyspnea  Cards:  negative for chest pain, palpitations, lower extremity edema  GI:   See HPI  :  negative for frequency, dysuria  Integument:  negative for rash and pruritus  Heme:  negative for easy bruising and gum/nose bleeding  Musculoskel: negative for myalgias,  back pain and muscle weakness  Neuro: negative for headaches, dizziness, vertigo  Psych:  negative for feelings of anxiety, depression       Objective:     Patient Vitals for the past 8 hrs:   Height Weight   04/12/22 0727 5' 5\" (1.651 m) 71.7 kg (158 lb)     No intake/output data recorded. No intake/output data recorded. EXAM:     NEURO-a&o   HEENT-wnl   LUNGS-clear    COR-regular rate and rhythym     ABD-soft , no tenderness, no rebound, good bs     EXT-no edema     Data Review     No results for input(s): WBC, HGB, HCT, PLT, HGBEXT, HCTEXT, PLTEXT in the last 72 hours. No results for input(s): NA, K, CL, CO2, BUN, CREA, GLU, PHOS, CA in the last 72 hours. No results for input(s): AP, TBIL, TP, ALB, GLOB, GGT, AML, LPSE in the last 72 hours. No lab exists for component: SGOT, GPT, AMYP, HLPSE  No results for input(s): INR, PTP, APTT, INREXT in the last 72 hours.        Assessment:     · Screening colonoscopy Patient Active Problem List   Diagnosis Code    Diverticulitis K57.92    Wound infection after surgery T81.49XA    Incisional pain L76.82    Desmoid tumor, abdominal incision D48.1    Irritable bowel syndrome with diarrhea K58.0    Palpitations R00.2         Plan:   ·   · Endoscopic procedure with sedation     Signed By: Kojo Amaya MD     4/12/2022  7:56 AM

## 2022-04-12 NOTE — PERIOP NOTES

## 2022-04-12 NOTE — ANESTHESIA POSTPROCEDURE EVALUATION
Post-Anesthesia Evaluation and Assessment    Patient: Shena Jarquin MRN: [id number]  SSN: [id number]    YOB: 1960  Age: 57 y.o. Sex: female      I have evaluated the patient and they are stable and ready for discharge from the PACU. Cardiovascular Function/Vital Signs  Visit Vitals  /79   Pulse 82   Temp 37 °C (98.6 °F)   Resp 14   Ht 5' 5\" (1.651 m)   Wt 71.7 kg (158 lb)   SpO2 99%   BMI 26.29 kg/m²       Patient is status post MAC anesthesia for Procedure(s):  COLONOSCOPY. Nausea/Vomiting: None    Postoperative hydration reviewed and adequate. Pain:  Pain Scale 1: Numeric (0 - 10) (04/12/22 0826)  Pain Intensity 1: 0 (04/12/22 0826)   Managed    Neurological Status: At baseline    Mental Status, Level of Consciousness: Alert and  oriented to person, place, and time    Pulmonary Status:   O2 Device: None (Room air) (04/12/22 0826)   Adequate oxygenation and airway patent    Complications related to anesthesia: None    Post-anesthesia assessment completed.  No concerns    Signed By: Princess Mistry MD     April 12, 2022

## 2022-04-12 NOTE — ANESTHESIA PREPROCEDURE EVALUATION
Anesthetic History   No history of anesthetic complications            Review of Systems / Medical History  Patient summary reviewed, nursing notes reviewed and pertinent labs reviewed    Pulmonary  Within defined limits                 Neuro/Psych   Within defined limits           Cardiovascular  Within defined limits                     GI/Hepatic/Renal  Within defined limits              Endo/Other  Within defined limits           Other Findings              Physical Exam    Airway  Mallampati: II  TM Distance: 4 - 6 cm  Neck ROM: normal range of motion   Mouth opening: Normal     Cardiovascular  Regular rate and rhythm,  S1 and S2 normal,  no murmur, click, rub, or gallop             Dental  No notable dental hx       Pulmonary  Breath sounds clear to auscultation               Abdominal  GI exam deferred       Other Findings            Anesthetic Plan    ASA: 1  Anesthesia type: MAC          Induction: Intravenous  Anesthetic plan and risks discussed with: Patient

## 2022-04-12 NOTE — DISCHARGE INSTRUCTIONS
[address], [address]  [id number]  1960    Discomfort:  Redness at IV site- apply warm compress to area; if redness or soreness persist- contact your physician  There may be a slight amount of blood passed from the rectum  Gaseous discomfort- walking, belching will help relieve any discomfort  You may not operate a vehicle for 12 hours  You may not engage in an occupation involving machinery or appliances for rest of today  You may not drink alcoholic beverages for at least 12 hours  Avoid making any critical decisions for at least 24 hour  DIET:  You may resume your regular diet - however -  remember your colon is empty and a heavy meal will produce gas. Avoid these foods:  vegetables, fried / greasy foods, carbonated drinks     ACTIVITY:  You may  resume your normal daily activities it is recommended that you spend the remainder of the day resting -  avoid any strenuous activity. CALL M.D.   ANY SIGN OF:   Increasing pain, nausea, vomiting  Abdominal distension (swelling)  New increased bleeding (oral or rectal)  Fever (chills)  Pain in chest area  Bloody discharge from nose or mouth  Shortness of breath      Follow-up Instructions:   Call Dr. Hernan Amaral for any questions or problems at [phone]   High fiber diet   Repeat colonoscopy in 5 years          ENDOSCOPY FINDINGS:   Your colonoscopy showed only diverticulosis, no polyps seen, rest of exam was normal.  Telephone # [phone]      Signed By: Hernan Amaral MD     4/12/2022  8:29 AM       DISCHARGE SUMMARY from Nurse    The following personal items collected during your admission are returned to you:   Dental Appliance:    Vision: Visual Aid: (P) Glasses  Hearing Aid:    Jewelry:    Clothing:    Other Valuables:    Valuables sent to safe:        Learning About Coronavirus [phone])  Coronavirus [phone]): Overview  What is coronavirus ([id number])? The coronavirus disease (COVID-19) is caused by a virus. It is an illness that was first found in [place], [place], in December 2019. It has since spread worldwide. The virus can cause fever, cough, and trouble breathing. In severe cases, it can cause pneumonia and make it hard to breathe without help. It can cause death. Coronaviruses are a large group of viruses. They cause the common cold. They also cause more serious illnesses like Middle East respiratory syndrome (MERS) and severe acute respiratory syndrome (SARS). COVID-19 is caused by a novel coronavirus. That means it's a new type that has not been seen in people before. This virus spreads person-to-person through droplets from coughing and sneezing. It can also spread when you are close to someone who is infected. And it can spread when you touch something that has the virus on it, such as a doorknob or a tabletop. What can you do to protect yourself from coronavirus (COVID-19)? The best way to protect yourself from getting sick is to:  · Avoid areas where there is an outbreak. · Avoid contact with people who may be infected. · Wash your hands often with soap or alcohol-based hand sanitizers. · Avoid crowds and try to stay at least 6 feet away from other people. · Wash your hands often, especially after you cough or sneeze. Use soap and water, and scrub for at least 20 seconds. If soap and water aren't available, use an alcohol-based hand . · Avoid touching your mouth, nose, and eyes. What can you do to avoid spreading the virus to others? To help avoid spreading the virus to others:  · Cover your mouth with a tissue when you cough or sneeze. Then throw the tissue in the trash. · Use a disinfectant to clean things that you touch often. · Stay home if you are sick or have been exposed to the virus. Don't go to school, work, or public areas. And don't use public transportation.   · If you are sick:  ? Leave your home only if you need to get medical care. But call the doctor's office first so they know you're coming. And wear a face mask, if you have one.  ? If you have a face mask, wear it whenever you're around other people. It can help stop the spread of the virus when you cough or sneeze. ? Clean and disinfect your home every day. Use household  and disinfectant wipes or sprays. Take special care to clean things that you grab with your hands. These include doorknobs, remote controls, phones, and handles on your refrigerator and microwave. And don't forget countertops, tabletops, bathrooms, and computer keyboards. When to call for help  Call 911 anytime you think you may need emergency care. For example, call if:  · You have severe trouble breathing. (You can't talk at all.)  · You have constant chest pain or pressure. · You are severely dizzy or lightheaded. · You are confused or can't think clearly. · Your face and lips have a blue color. · You pass out (lose consciousness) or are very hard to wake up. Call your doctor now if you develop symptoms such as:  · Shortness of breath. · Fever. · Cough. If you need to get care, call ahead to the doctor's office for instructions before you go. Make sure you wear a face mask, if you have one, to prevent exposing other people to the virus. Where can you get the latest information? The following health organizations are tracking and studying this virus. Their websites contain the most up-to-date information. Tyrone Patel also learn what to do if you think you may have been exposed to the virus. · U.S. Centers for Disease Control and Prevention (CDC): The CDC provides updated news about the disease and travel advice. The website also tells you how to prevent the spread of infection. www.cdc.gov  · World Health Organization [facility]): WHO offers information about the virus outbreaks.  WHO also has travel advice. www.who.int  Current as of: April 1, 2020               Content Version: 12.4  © 3750-2875 [employer]. Care instructions adapted under license by your healthcare professional. If you have questions about a medical condition or this instruction, always ask your healthcare professional. [address] any warranty or liability for your use of this information.

## 2022-04-12 NOTE — PROGRESS NOTES
Ranjana Jaimes  1960  [id number]    Situation:  Verbal report received from: Arlene Ramsey RN  Procedure: Procedure(s):  COLONOSCOPY    Background:    Preoperative diagnosis: Personal history of colonic polyps [Z86.010]  Postoperative diagnosis: Divertivulosis    :  Dr. Tiera Jones  Assistant(s): Endoscopy Technician-1: Travis Cheung  Endoscopy RN-1: Leanna Dean RN    Specimens: * No specimens in log *  H. Pylori  no    Assessment:  Intra-procedure medications   Anesthesia gave intra-procedure sedation and medications, see anesthesia flow sheet yes    Intravenous fluids: NS@ KVO     Vital signs stable     Abdominal assessment: round and soft     Recommendation:  Discharge patient per MD order.   Family or Friend - - Deisy June to share finding with family or friend

## 2022-09-18 ENCOUNTER — APPOINTMENT (OUTPATIENT)
Dept: GENERAL RADIOLOGY | Age: 62
End: 2022-09-18
Attending: FAMILY MEDICINE
Payer: MEDICARE

## 2022-09-18 ENCOUNTER — HOSPITAL ENCOUNTER (EMERGENCY)
Age: 62
Discharge: HOME | End: 2022-09-18
Attending: STUDENT IN AN ORGANIZED HEALTH CARE EDUCATION/TRAINING PROGRAM
Payer: MEDICARE

## 2022-09-18 VITALS
SYSTOLIC BLOOD PRESSURE: 154 MMHG | TEMPERATURE: 97.8 F | BODY MASS INDEX: 25.49 KG/M2 | HEIGHT: 65 IN | HEART RATE: 60 BPM | OXYGEN SATURATION: 99 % | RESPIRATION RATE: 16 BRPM | WEIGHT: 153 LBS | DIASTOLIC BLOOD PRESSURE: 75 MMHG

## 2022-09-18 DIAGNOSIS — R07.9 CHEST PAIN, UNSPECIFIED TYPE: Primary | ICD-10-CM

## 2022-09-18 LAB
ALBUMIN SERPL-MCNC: 4.3 G/DL (ref 3.5–5)
ALBUMIN/GLOB SERPL: 1.2 {RATIO} (ref 1.1–2.2)
ALP SERPL-CCNC: 64 U/L (ref 45–117)
ALT SERPL-CCNC: 30 U/L (ref 12–78)
ANION GAP SERPL CALC-SCNC: 8 MMOL/L (ref 5–15)
AST SERPL-CCNC: 15 U/L (ref 15–37)
BASOPHILS # BLD: 0.1 K/UL (ref 0–0.1)
BASOPHILS NFR BLD: 1 % (ref 0–1)
BILIRUB SERPL-MCNC: 0.4 MG/DL (ref 0.2–1)
BUN SERPL-MCNC: 15 MG/DL (ref 6–20)
BUN/CREAT SERPL: 19 (ref 12–20)
CALCIUM SERPL-MCNC: 9.4 MG/DL (ref 8.5–10.1)
CHLORIDE SERPL-SCNC: 108 MMOL/L (ref 97–108)
CO2 SERPL-SCNC: 23 MMOL/L (ref 21–32)
COMMENT, HOLDF: NORMAL
CREAT SERPL-MCNC: 0.81 MG/DL (ref 0.55–1.02)
D DIMER PPP FEU-MCNC: <0.19 MG/L FEU (ref 0–0.65)
DIFFERENTIAL METHOD BLD: ABNORMAL
EOSINOPHIL # BLD: 0.3 K/UL (ref 0–0.4)
EOSINOPHIL NFR BLD: 3 % (ref 0–7)
ERYTHROCYTE [DISTWIDTH] IN BLOOD BY AUTOMATED COUNT: 13.6 % (ref 11.5–14.5)
GLOBULIN SER CALC-MCNC: 3.7 G/DL (ref 2–4)
GLUCOSE SERPL-MCNC: 101 MG/DL (ref 65–100)
HCT VFR BLD AUTO: 41.7 % (ref 35–47)
HGB BLD-MCNC: 14.6 G/DL (ref 11.5–16)
IMM GRANULOCYTES # BLD AUTO: 0 K/UL (ref 0–0.04)
IMM GRANULOCYTES NFR BLD AUTO: 0 % (ref 0–0.5)
LYMPHOCYTES # BLD: 3.7 K/UL (ref 0.8–3.5)
LYMPHOCYTES NFR BLD: 37 % (ref 12–49)
MCH RBC QN AUTO: 31.7 PG (ref 26–34)
MCHC RBC AUTO-ENTMCNC: 35 G/DL (ref 30–36.5)
MCV RBC AUTO: 90.5 FL (ref 80–99)
MONOCYTES # BLD: 0.6 K/UL (ref 0–1)
MONOCYTES NFR BLD: 6 % (ref 5–13)
NEUTS SEG # BLD: 5.4 K/UL (ref 1.8–8)
NEUTS SEG NFR BLD: 53 % (ref 32–75)
NRBC # BLD: 0 K/UL (ref 0–0.01)
NRBC BLD-RTO: 0 PER 100 WBC
PLATELET # BLD AUTO: 282 K/UL (ref 150–400)
PMV BLD AUTO: 11.3 FL (ref 8.9–12.9)
POTASSIUM SERPL-SCNC: 3.6 MMOL/L (ref 3.5–5.1)
PROT SERPL-MCNC: 8 G/DL (ref 6.4–8.2)
RBC # BLD AUTO: 4.61 M/UL (ref 3.8–5.2)
SAMPLES BEING HELD,HOLD: NORMAL
SODIUM SERPL-SCNC: 139 MMOL/L (ref 136–145)
TROPONIN-HIGH SENSITIVITY: 7 NG/L (ref 0–51)
TROPONIN-HIGH SENSITIVITY: 8 NG/L (ref 0–51)
WBC # BLD AUTO: 10 K/UL (ref 3.6–11)

## 2022-09-18 PROCEDURE — 93005 ELECTROCARDIOGRAM TRACING: CPT

## 2022-09-18 PROCEDURE — 71046 X-RAY EXAM CHEST 2 VIEWS: CPT

## 2022-09-18 PROCEDURE — 80053 COMPREHEN METABOLIC PANEL: CPT

## 2022-09-18 PROCEDURE — 36415 COLL VENOUS BLD VENIPUNCTURE: CPT

## 2022-09-18 PROCEDURE — 99285 EMERGENCY DEPT VISIT HI MDM: CPT

## 2022-09-18 PROCEDURE — 85025 COMPLETE CBC W/AUTO DIFF WBC: CPT

## 2022-09-18 PROCEDURE — 85379 FIBRIN DEGRADATION QUANT: CPT

## 2022-09-18 PROCEDURE — 84484 ASSAY OF TROPONIN QUANT: CPT

## 2022-09-18 NOTE — ED TRIAGE NOTES
Pt arrives ambulatory c/o chest pain while running errands. Pt drove home and felt \"stabbing pain\" radiating around to upper back. 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 Pt is AOx4 in no apparent distress. Pt was given 325 ASA by  who is hospitalist at [facility].

## 2022-09-19 LAB
ATRIAL RATE: 80 BPM
CALCULATED P AXIS, ECG09: 77 DEGREES
CALCULATED R AXIS, ECG10: 28 DEGREES
CALCULATED T AXIS, ECG11: 73 DEGREES
DIAGNOSIS, 93000: NORMAL
P-R INTERVAL, ECG05: 162 MS
Q-T INTERVAL, ECG07: 398 MS
QRS DURATION, ECG06: 72 MS
QTC CALCULATION (BEZET), ECG08: 459 MS
VENTRICULAR RATE, ECG03: 80 BPM

## 2022-09-19 NOTE — DISCHARGE INSTRUCTIONS
Thank you for allowing us to provide you with medical care today. We realize that you have many choices for your emergency care needs. We thank you for choosing [facility]. Please choose us in the future for any continued health care needs. The exam and treatment you received in the emergency department were for an emergent problem and are not intended as complete care. It is important that you follow-up with a doctor. If your symptoms worsen or you do not improve should return to the emergency department. We are available 24 hours a day. Please make an appointment with your health care provider for follow-up of your emergency department visit. Take this sheet with you when you go to your follow-up visit.

## 2022-09-19 NOTE — ED PROVIDER NOTES
Patient is a 60-year-old female with past medical history of anoxic brain injury, diverticulitis, IBS presenting for evaluation of chest pain. Reports that she was sitting in her car driving when she developed a sudden stabbing pain to her chest.  This pain radiated to her back. She then began yawning, which she states is abnormal for her as she is not typically a runner. The episode lasted for approximately 15 minutes and then resolved. Reports she has a sensation of pins-and-needles in her left arm. Chest currently feels heavy, but not painful. Denies history of PE or DVT. 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 Livan Ramsey- [facility]    HX OTHER SURGICAL  1-17-14    biopsy of spindle cell neoplasm - [facility]- Dr. Livan Ramsey    HX OTHER SURGICAL  2-7-2014    wide excision of desmoid tumor of abdominal wall-[facility]-DR. Livan Ramsey    CT APPENDECTOMY           Family History:   Problem Relation Age of Onset    Hypertension Mother     Stroke Mother     Diabetes Mother     Asthma Neg Hx     Cancer Neg Hx     Anesth Problems Neg Hx        Social History     Socioeconomic History    Marital status:      Spouse name: Not on file    Number of children: Not on file    Years of education: Not on file    Highest education level: Not on file   Occupational History    Not on file   Tobacco Use    Smoking status: Never    Smokeless tobacco: Never   Substance and Sexual Activity    Alcohol use: Yes     Comment: socially    Drug use: Not on file    Sexual activity: Not on file   Other Topics Concern    Not on file   Social History Narrative    Not on file     Social Determinants of Health     Financial Resource Strain: Not on file   Food Insecurity: Not on file   Transportation Needs: Not on file   Physical Activity: Not on file   Stress: Not on file   Social Connections: Not on file   Intimate Partner Violence: Not on file   Housing Stability: Not on file         ALLERGIES: Compazine [prochlorperazine edisylate], Augmentin [amoxicillin-pot clavulanate], and Red dye    Review of Systems   Constitutional:  Negative for unexpected weight change. HENT:  Negative for congestion. Respiratory:  Negative for cough, chest tightness and shortness of breath. Cardiovascular:  Positive for chest pain. Negative for palpitations and leg swelling. Gastrointestinal:  Negative for abdominal pain, nausea and vomiting. Endocrine: Negative for polyuria. Genitourinary:  Negative for dysuria and flank pain. Musculoskeletal:  Negative for back pain. Skin:  Negative for color change. Allergic/Immunologic: Negative for immunocompromised state. Neurological:  Negative for dizziness and headaches. Hematological:  Negative for adenopathy. Psychiatric/Behavioral:  Negative for agitation. Vitals:    09/18/22 1924   BP: (!) 160/81   Pulse: (!) 52   Resp: 16   Temp: 97.6 °F (36.4 °C)   SpO2: 98%   Weight: 69.4 kg (153 lb)   Height: 5' 5\" (1.651 m)            Physical Exam  Vitals and nursing note reviewed. Constitutional:       General: She is not in acute distress. Appearance: She is well-developed and normal weight. HENT:      Head: Atraumatic. Eyes:      Pupils: Pupils are equal, round, and reactive to light. Cardiovascular:      Rate and Rhythm: Normal rate and regular rhythm. Pulses:           Posterior tibial pulses are 2+ on the right side and 2+ on the left side.       Heart sounds: Normal heart sounds. Pulmonary:      Effort: Pulmonary effort is normal.      Breath sounds: Normal breath sounds. Chest:      Chest wall: Tenderness present. Abdominal:      General: Bowel sounds are normal.      Palpations: Abdomen is soft. Musculoskeletal:         General: Normal range of motion. Cervical back: Neck supple. Right lower leg: No tenderness. No edema. Left lower leg: No tenderness. No edema. Skin:     General: Skin is warm and dry. Capillary Refill: Capillary refill takes less than 2 seconds. Neurological:      General: No focal deficit present. Mental Status: She is alert and oriented to person, place, and time. Psychiatric:         Mood and Affect: Mood normal.         Behavior: Behavior normal.        MDM  Number of Diagnoses or Management Options  Chest pain, unspecified type  Diagnosis management comments: Patient presenting with episode of chest pain, palpitations. Differential diagnosis includes MI, PE, musculoskeletal pain, anxiety. 2255 -chest x-ray with no evidence of cardiomegaly. D-dimer negative, low clinical suspicion for PE, initial troponin is 8, repeat troponin is 7. EKG without any acute changes. Patient will be safe to follow-up outpatient with cardiology for echocardiogram and stress testing. ED EKG interpretation:11:39 PM  Rhythm: normal sinus rhythm; and regular. Rate (approx.): 80; Axis: normal; P wave: normal; ST/T wave: no concerning ST elevations or depressions; Other findings: occasional PVCs. EKG has also been evaluated by attending ED physician.      Cielo Jensen NP        Amount and/or Complexity of Data Reviewed  Clinical lab tests: reviewed and ordered  Tests in the radiology section of CPT®: reviewed and ordered    Patient Progress  Patient progress: stable         Procedures

## 2023-01-30 ENCOUNTER — TRANSCRIBE ORDER (OUTPATIENT)
Dept: SCHEDULING | Age: 63
End: 2023-01-30

## 2023-01-30 DIAGNOSIS — Z12.31 BREAST CANCER SCREENING BY MAMMOGRAM: Primary | ICD-10-CM

## 2023-04-05 ENCOUNTER — HOSPITAL ENCOUNTER (OUTPATIENT)
Dept: MAMMOGRAPHY | Age: 63
End: 2023-04-05
Attending: INTERNAL MEDICINE
Payer: COMMERCIAL

## 2023-04-05 PROCEDURE — 77063 BREAST TOMOSYNTHESIS BI: CPT

## 2023-04-22 DIAGNOSIS — Z12.31 BREAST CANCER SCREENING BY MAMMOGRAM: Primary | ICD-10-CM

## 2024-12-11 ENCOUNTER — HOSPITAL ENCOUNTER (OUTPATIENT)
Facility: HOSPITAL | Age: 64
Discharge: HOME | End: 2024-12-14
Payer: COMMERCIAL

## 2024-12-11 VITALS — WEIGHT: 162 LBS | BODY MASS INDEX: 26.99 KG/M2 | HEIGHT: 65 IN

## 2024-12-11 DIAGNOSIS — Z12.31 OTHER SCREENING MAMMOGRAM: ICD-10-CM

## 2024-12-11 PROCEDURE — 77063 BREAST TOMOSYNTHESIS BI: CPT

## (undated) DEVICE — TOWEL SURG W17XL27IN STD BLU COT NONFENESTRATED PREWASHED

## (undated) DEVICE — REM POLYHESIVE ADULT PATIENT RETURN ELECTRODE: Brand: VALLEYLAB

## (undated) DEVICE — SOLUTION IRRIG 1000ML 0.9% SOD CHL USP POUR PLAS BTL

## (undated) DEVICE — PACK,EENT,TURBAN DRAPE,PK II: Brand: MEDLINE

## (undated) DEVICE — SMOKE EVACUATION PENCIL: Brand: VALLEYLAB

## (undated) DEVICE — INTENDED FOR TISSUE SEPARATION, AND OTHER PROCEDURES THAT REQUIRE A SHARP SURGICAL BLADE TO PUNCTURE OR CUT.: Brand: BARD-PARKER ® CARBON RIB-BACK BLADES

## (undated) DEVICE — HOOKS ELASTIC STAY 12MM BLUNT -- PK/8

## (undated) DEVICE — SPONGE: SPECIALTY PEANUT XR 100/CS: Brand: MEDICAL ACTION INDUSTRIES

## (undated) DEVICE — SUTURE CHROMIC GUT SZ 3-0 L54IN ABSRB BRN LIGAPAK DISPNS L112G

## (undated) DEVICE — SUTURE MCRYL SZ 3-0 L27IN ABSRB UD L24MM PS-1 3/8 CIR PRIM Y936H

## (undated) DEVICE — DERMABOND SKIN ADH 0.7ML -- DERMABOND ADVANCED 12/BX

## (undated) DEVICE — SYR 10ML CTRL LR LCK NSAF LF --

## (undated) DEVICE — SUTURE ABSORBABLE BRAIDED 4-0 P-3 18 IN UD VICRYL J494G

## (undated) DEVICE — BASIN ST MAJOR-NO CAUTERY: Brand: MEDLINE INDUSTRIES, INC.

## (undated) DEVICE — GLOVE ORANGE PI 7 1/2   MSG9075

## (undated) DEVICE — SPONGE LAP 18X18IN STRL -- 5/PK

## (undated) DEVICE — TRAY PREP DRY W/ PREM GLV 2 APPL 6 SPNG 2 UNDPD 1 OVERWRAP